# Patient Record
Sex: FEMALE | Race: WHITE | Employment: OTHER | URBAN - METROPOLITAN AREA
[De-identification: names, ages, dates, MRNs, and addresses within clinical notes are randomized per-mention and may not be internally consistent; named-entity substitution may affect disease eponyms.]

---

## 2018-10-29 ENCOUNTER — RECORDS - HEALTHEAST (OUTPATIENT)
Dept: LAB | Facility: CLINIC | Age: 83
End: 2018-10-29

## 2018-10-30 LAB
ANION GAP SERPL CALCULATED.3IONS-SCNC: 9 MMOL/L (ref 5–18)
BUN SERPL-MCNC: 28 MG/DL (ref 8–28)
CALCIUM SERPL-MCNC: 9.8 MG/DL (ref 8.5–10.5)
CHLORIDE BLD-SCNC: 102 MMOL/L (ref 98–107)
CO2 SERPL-SCNC: 25 MMOL/L (ref 22–31)
CREAT SERPL-MCNC: 1.18 MG/DL (ref 0.6–1.1)
ERYTHROCYTE [DISTWIDTH] IN BLOOD BY AUTOMATED COUNT: 13.9 % (ref 11–14.5)
GFR SERPL CREATININE-BSD FRML MDRD: 43 ML/MIN/1.73M2
GLUCOSE BLD-MCNC: 93 MG/DL (ref 70–125)
HCT VFR BLD AUTO: 39 % (ref 35–47)
HGB BLD-MCNC: 12.1 G/DL (ref 12–16)
MCH RBC QN AUTO: 32.4 PG (ref 27–34)
MCHC RBC AUTO-ENTMCNC: 31 G/DL (ref 32–36)
MCV RBC AUTO: 104 FL (ref 80–100)
PLATELET # BLD AUTO: 369 THOU/UL (ref 140–440)
PMV BLD AUTO: 9.8 FL (ref 8.5–12.5)
POTASSIUM BLD-SCNC: 5.1 MMOL/L (ref 3.5–5)
RBC # BLD AUTO: 3.74 MILL/UL (ref 3.8–5.4)
SODIUM SERPL-SCNC: 136 MMOL/L (ref 136–145)
WBC: 6.3 THOU/UL (ref 4–11)

## 2020-10-06 ENCOUNTER — TRANSFERRED RECORDS (OUTPATIENT)
Dept: MULTI SPECIALTY CLINIC | Facility: CLINIC | Age: 85
End: 2020-10-06

## 2020-10-06 DIAGNOSIS — Z53.9 ERRONEOUS ENCOUNTER--DISREGARD: Primary | ICD-10-CM

## 2020-10-08 ENCOUNTER — TRANSFERRED RECORDS (OUTPATIENT)
Dept: HEALTH INFORMATION MANAGEMENT | Facility: CLINIC | Age: 85
End: 2020-10-08

## 2020-10-09 ENCOUNTER — HOSPITAL ENCOUNTER (INPATIENT)
Facility: CLINIC | Age: 85
LOS: 3 days | Discharge: NURSING, REHAB OR LONG-TERM CARE | DRG: 177 | End: 2020-10-12
Attending: HOSPITALIST | Admitting: HOSPITALIST
Payer: MEDICARE

## 2020-10-09 ENCOUNTER — TELEPHONE (OUTPATIENT)
Facility: CLINIC | Age: 85
End: 2020-10-09

## 2020-10-09 ENCOUNTER — TRANSFERRED RECORDS (OUTPATIENT)
Dept: HEALTH INFORMATION MANAGEMENT | Facility: CLINIC | Age: 85
End: 2020-10-09

## 2020-10-09 ENCOUNTER — APPOINTMENT (OUTPATIENT)
Dept: GENERAL RADIOLOGY | Facility: CLINIC | Age: 85
DRG: 177 | End: 2020-10-09
Attending: HOSPITALIST
Payer: MEDICARE

## 2020-10-09 DIAGNOSIS — U07.1 2019 NOVEL CORONAVIRUS DISEASE (COVID-19): ICD-10-CM

## 2020-10-09 DIAGNOSIS — R05.9 COUGH: Primary | ICD-10-CM

## 2020-10-09 LAB
ANION GAP SERPL CALCULATED.3IONS-SCNC: 8 MMOL/L (ref 3–14)
BUN SERPL-MCNC: 22 MG/DL (ref 7–30)
C COLI+JEJUNI+LARI FUSA STL QL NAA+PROBE: NOT DETECTED
C DIFF TOX B STL QL: NEGATIVE
CALCIUM SERPL-MCNC: 8.3 MG/DL (ref 8.5–10.1)
CHLORIDE SERPL-SCNC: 104 MMOL/L (ref 94–109)
CO2 SERPL-SCNC: 25 MMOL/L (ref 20–32)
CREAT SERPL-MCNC: 1.23 MG/DL (ref 0.52–1.04)
CRP SERPL-MCNC: 5.7 MG/L (ref 0–8)
D DIMER PPP FEU-MCNC: 0.8 UG/ML FEU (ref 0–0.5)
EC STX1 GENE STL QL NAA+PROBE: NOT DETECTED
EC STX2 GENE STL QL NAA+PROBE: NOT DETECTED
ENTERIC PATHOGEN COMMENT: NORMAL
ERYTHROCYTE [DISTWIDTH] IN BLOOD BY AUTOMATED COUNT: 14.4 % (ref 10–15)
GFR SERPL CREATININE-BSD FRML MDRD: 39 ML/MIN/{1.73_M2}
GLUCOSE SERPL-MCNC: 84 MG/DL (ref 70–99)
HCT VFR BLD AUTO: 36.7 % (ref 35–47)
HGB BLD-MCNC: 11.4 G/DL (ref 11.7–15.7)
LABORATORY COMMENT REPORT: ABNORMAL
MCH RBC QN AUTO: 32.4 PG (ref 26.5–33)
MCHC RBC AUTO-ENTMCNC: 31.1 G/DL (ref 31.5–36.5)
MCV RBC AUTO: 104 FL (ref 78–100)
NOROV GI+II ORF1-ORF2 JNC STL QL NAA+PR: NOT DETECTED
PLATELET # BLD AUTO: 115 10E9/L (ref 150–450)
POTASSIUM SERPL-SCNC: 4.5 MMOL/L (ref 3.4–5.3)
PROCALCITONIN SERPL-MCNC: <0.05 NG/ML
RBC # BLD AUTO: 3.52 10E12/L (ref 3.8–5.2)
RVA NSP5 STL QL NAA+PROBE: NOT DETECTED
SALMONELLA SP RPOD STL QL NAA+PROBE: NOT DETECTED
SARS-COV-2 RNA SPEC QL NAA+PROBE: NORMAL
SARS-COV-2 RNA SPEC QL NAA+PROBE: POSITIVE
SHIGELLA SP+EIEC IPAH STL QL NAA+PROBE: NOT DETECTED
SODIUM SERPL-SCNC: 137 MMOL/L (ref 133–144)
SPECIMEN SOURCE: ABNORMAL
SPECIMEN SOURCE: NORMAL
SPECIMEN SOURCE: NORMAL
V CHOL+PARA RFBL+TRKH+TNAA STL QL NAA+PR: NOT DETECTED
WBC # BLD AUTO: 3.6 10E9/L (ref 4–11)
Y ENTERO RECN STL QL NAA+PROBE: NOT DETECTED

## 2020-10-09 PROCEDURE — 85379 FIBRIN DEGRADATION QUANT: CPT | Performed by: HOSPITALIST

## 2020-10-09 PROCEDURE — 71045 X-RAY EXAM CHEST 1 VIEW: CPT

## 2020-10-09 PROCEDURE — 86140 C-REACTIVE PROTEIN: CPT | Performed by: HOSPITALIST

## 2020-10-09 PROCEDURE — 120N000004 HC R&B MS OVERFLOW

## 2020-10-09 PROCEDURE — U0003 INFECTIOUS AGENT DETECTION BY NUCLEIC ACID (DNA OR RNA); SEVERE ACUTE RESPIRATORY SYNDROME CORONAVIRUS 2 (SARS-COV-2) (CORONAVIRUS DISEASE [COVID-19]), AMPLIFIED PROBE TECHNIQUE, MAKING USE OF HIGH THROUGHPUT TECHNOLOGIES AS DESCRIBED BY CMS-2020-01-R: HCPCS | Performed by: HOSPITALIST

## 2020-10-09 PROCEDURE — 85027 COMPLETE CBC AUTOMATED: CPT | Performed by: HOSPITALIST

## 2020-10-09 PROCEDURE — 99223 1ST HOSP IP/OBS HIGH 75: CPT | Mod: AI | Performed by: HOSPITALIST

## 2020-10-09 PROCEDURE — 87506 IADNA-DNA/RNA PROBE TQ 6-11: CPT | Performed by: HOSPITALIST

## 2020-10-09 PROCEDURE — 250N000013 HC RX MED GY IP 250 OP 250 PS 637: Performed by: PHYSICIAN ASSISTANT

## 2020-10-09 PROCEDURE — 999N000156 HC STATISTIC RCP CONSULT EA 30 MIN

## 2020-10-09 PROCEDURE — 84145 PROCALCITONIN (PCT): CPT | Performed by: HOSPITALIST

## 2020-10-09 PROCEDURE — 99207 PR APP CREDIT; MD BILLING SHARED VISIT: CPT | Performed by: PHYSICIAN ASSISTANT

## 2020-10-09 PROCEDURE — 80048 BASIC METABOLIC PNL TOTAL CA: CPT | Performed by: HOSPITALIST

## 2020-10-09 PROCEDURE — 36415 COLL VENOUS BLD VENIPUNCTURE: CPT | Performed by: HOSPITALIST

## 2020-10-09 PROCEDURE — 87493 C DIFF AMPLIFIED PROBE: CPT | Performed by: HOSPITALIST

## 2020-10-09 RX ORDER — ACETAMINOPHEN 325 MG/1
650 TABLET ORAL EVERY 4 HOURS PRN
Status: DISCONTINUED | OUTPATIENT
Start: 2020-10-09 | End: 2020-10-12 | Stop reason: HOSPADM

## 2020-10-09 RX ORDER — NALOXONE HYDROCHLORIDE 0.4 MG/ML
.1-.4 INJECTION, SOLUTION INTRAMUSCULAR; INTRAVENOUS; SUBCUTANEOUS
Status: DISCONTINUED | OUTPATIENT
Start: 2020-10-09 | End: 2020-10-12 | Stop reason: HOSPADM

## 2020-10-09 RX ORDER — DIVALPROEX SODIUM 125 MG/1
125 CAPSULE, COATED PELLETS ORAL 3 TIMES DAILY
Status: DISCONTINUED | OUTPATIENT
Start: 2020-10-09 | End: 2020-10-12 | Stop reason: HOSPADM

## 2020-10-09 RX ORDER — AMLODIPINE BESYLATE 2.5 MG/1
2.5 TABLET ORAL DAILY
Status: DISCONTINUED | OUTPATIENT
Start: 2020-10-09 | End: 2020-10-12 | Stop reason: HOSPADM

## 2020-10-09 RX ORDER — ONDANSETRON 4 MG/1
4 TABLET, ORALLY DISINTEGRATING ORAL EVERY 6 HOURS PRN
Status: DISCONTINUED | OUTPATIENT
Start: 2020-10-09 | End: 2020-10-12 | Stop reason: HOSPADM

## 2020-10-09 RX ORDER — DIVALPROEX SODIUM 125 MG/1
125 CAPSULE, COATED PELLETS ORAL 3 TIMES DAILY
COMMUNITY

## 2020-10-09 RX ORDER — LIDOCAINE 40 MG/G
CREAM TOPICAL
Status: DISCONTINUED | OUTPATIENT
Start: 2020-10-09 | End: 2020-10-12 | Stop reason: HOSPADM

## 2020-10-09 RX ORDER — QUETIAPINE FUMARATE 50 MG/1
50 TABLET, FILM COATED ORAL DAILY
Status: DISCONTINUED | OUTPATIENT
Start: 2020-10-09 | End: 2020-10-12 | Stop reason: HOSPADM

## 2020-10-09 RX ORDER — GABAPENTIN 250 MG/5ML
50 SOLUTION ORAL
COMMUNITY

## 2020-10-09 RX ORDER — ATORVASTATIN CALCIUM 20 MG/1
20 TABLET, FILM COATED ORAL AT BEDTIME
Status: DISCONTINUED | OUTPATIENT
Start: 2020-10-09 | End: 2020-10-12 | Stop reason: HOSPADM

## 2020-10-09 RX ORDER — DULOXETIN HYDROCHLORIDE 60 MG/1
60 CAPSULE, DELAYED RELEASE ORAL DAILY
COMMUNITY

## 2020-10-09 RX ORDER — AMLODIPINE BESYLATE 2.5 MG/1
2.5 TABLET ORAL DAILY
COMMUNITY

## 2020-10-09 RX ORDER — QUETIAPINE FUMARATE 50 MG/1
50 TABLET, FILM COATED ORAL EVERY 4 HOURS PRN
COMMUNITY

## 2020-10-09 RX ORDER — ONDANSETRON 2 MG/ML
4 INJECTION INTRAMUSCULAR; INTRAVENOUS EVERY 6 HOURS PRN
Status: DISCONTINUED | OUTPATIENT
Start: 2020-10-09 | End: 2020-10-12 | Stop reason: HOSPADM

## 2020-10-09 RX ORDER — ACETAMINOPHEN 325 MG/1
650 TABLET ORAL EVERY 4 HOURS PRN
COMMUNITY

## 2020-10-09 RX ORDER — GUAIFENESIN/DEXTROMETHORPHAN 100-10MG/5
5 SYRUP ORAL EVERY 4 HOURS PRN
Status: DISCONTINUED | OUTPATIENT
Start: 2020-10-09 | End: 2020-10-12 | Stop reason: HOSPADM

## 2020-10-09 RX ORDER — GABAPENTIN 250 MG/5ML
50 SOLUTION ORAL
Status: DISCONTINUED | OUTPATIENT
Start: 2020-10-09 | End: 2020-10-12 | Stop reason: HOSPADM

## 2020-10-09 RX ORDER — TRAZODONE HYDROCHLORIDE 50 MG/1
50 TABLET, FILM COATED ORAL
COMMUNITY

## 2020-10-09 RX ORDER — QUETIAPINE FUMARATE 50 MG/1
50 TABLET, FILM COATED ORAL DAILY
COMMUNITY

## 2020-10-09 RX ORDER — MIRTAZAPINE 15 MG/1
15 TABLET, FILM COATED ORAL AT BEDTIME
COMMUNITY

## 2020-10-09 RX ORDER — CHOLECALCIFEROL (VITAMIN D3) 50 MCG
1 TABLET ORAL DAILY
COMMUNITY

## 2020-10-09 RX ORDER — DULOXETIN HYDROCHLORIDE 60 MG/1
60 CAPSULE, DELAYED RELEASE ORAL DAILY
Status: DISCONTINUED | OUTPATIENT
Start: 2020-10-09 | End: 2020-10-12 | Stop reason: HOSPADM

## 2020-10-09 RX ORDER — ALBUTEROL SULFATE 90 UG/1
2 AEROSOL, METERED RESPIRATORY (INHALATION) EVERY 6 HOURS PRN
Status: DISCONTINUED | OUTPATIENT
Start: 2020-10-09 | End: 2020-10-12 | Stop reason: HOSPADM

## 2020-10-09 RX ORDER — MIRTAZAPINE 15 MG/1
15 TABLET, FILM COATED ORAL AT BEDTIME
Status: DISCONTINUED | OUTPATIENT
Start: 2020-10-09 | End: 2020-10-12 | Stop reason: HOSPADM

## 2020-10-09 RX ORDER — DIVALPROEX SODIUM 125 MG/1
125 TABLET, DELAYED RELEASE ORAL 3 TIMES DAILY
Status: ON HOLD | COMMUNITY
End: 2020-10-09

## 2020-10-09 RX ORDER — GABAPENTIN 250 MG/5ML
50 SOLUTION ORAL 4 TIMES DAILY
COMMUNITY

## 2020-10-09 RX ORDER — QUETIAPINE FUMARATE 50 MG/1
50 TABLET, FILM COATED ORAL EVERY 4 HOURS PRN
Status: DISCONTINUED | OUTPATIENT
Start: 2020-10-09 | End: 2020-10-12 | Stop reason: HOSPADM

## 2020-10-09 RX ORDER — GABAPENTIN 250 MG/5ML
50 SOLUTION ORAL 4 TIMES DAILY
Status: DISCONTINUED | OUTPATIENT
Start: 2020-10-09 | End: 2020-10-12 | Stop reason: HOSPADM

## 2020-10-09 RX ORDER — ATORVASTATIN CALCIUM 20 MG/1
20 TABLET, FILM COATED ORAL AT BEDTIME
COMMUNITY

## 2020-10-09 RX ADMIN — DULOXETINE 60 MG: 60 CAPSULE, DELAYED RELEASE ORAL at 17:06

## 2020-10-09 RX ADMIN — ATORVASTATIN CALCIUM 20 MG: 20 TABLET, FILM COATED ORAL at 20:54

## 2020-10-09 RX ADMIN — GUAIFENESIN AND DEXTROMETHORPHAN 5 ML: 100; 10 SYRUP ORAL at 20:49

## 2020-10-09 RX ADMIN — GABAPENTIN 50 MG: 250 SUSPENSION ORAL at 20:51

## 2020-10-09 RX ADMIN — GUAIFENESIN AND DEXTROMETHORPHAN 5 ML: 100; 10 SYRUP ORAL at 14:51

## 2020-10-09 RX ADMIN — DIVALPROEX SODIUM 125 MG: 125 CAPSULE, COATED PELLETS ORAL at 17:06

## 2020-10-09 RX ADMIN — GABAPENTIN 50 MG: 250 SUSPENSION ORAL at 17:07

## 2020-10-09 RX ADMIN — DIVALPROEX SODIUM 125 MG: 125 CAPSULE, COATED PELLETS ORAL at 20:51

## 2020-10-09 RX ADMIN — MIRTAZAPINE 15 MG: 15 TABLET, FILM COATED ORAL at 20:50

## 2020-10-09 RX ADMIN — QUETIAPINE 50 MG: 50 TABLET ORAL at 17:06

## 2020-10-09 ASSESSMENT — ACTIVITIES OF DAILY LIVING (ADL): EQUIPMENT_CURRENTLY_USED_AT_HOME: WALKER, STANDARD

## 2020-10-09 NOTE — PLAN OF CARE
ROOM #208    Living Situation (if not independent, order SW consult):  Facility name:  :    Activity level at baseline: independent  Activity level on admit: SBA      Patient registered to observation; given Patient Bill of Rights; given the opportunity to ask questions about observation status and their plan of care.  Patient has been oriented to the observation room, bathroom and call light is in place.    Discussed discharge goals and expectations with patient/family.

## 2020-10-09 NOTE — PROGRESS NOTES
[facility]    Hospitalist Progress Note      Assessment & Plan   Mercedes Fraire is a 88 year old female with PMhx of aortic stenosis (s/p AVR), CAD (s/p CABG), OA, anxiety, diverticulitis, colon cancer, renal cell ca, HTN, osteoporosis, dyslipidemia, IBS, osteoporosis, who was admitted to observation from outside ED for cough, diarrhea and probable COVID-19 exposure.    Active Hospital Problems    #COVID 19 Virus Infection  Presented with mild hypoxemic respiratory failure, generalized weakness, cough, non-bloody loose stools. Symptom onset ~Wednesday 10/7.  Maintaining oxygen saturations on room air. No leukocytosis, systemically non toxic appearing. Inflammatory markers not significantly elevated including procal. No pneumonia or opacities on repeated CXR.    Patient lives in a facility with multiple recent COVID-19 cases.    -Continue hold of abx as no clear sign of superimposed bacterial infection at this point and clinically stable  -if hypoxemic, clinically worsening would start decadron   -Supportive cares with encourage p.o. fluids, acetaminophen, Robitussin for cough, prn albuterol   -At this point medically stable however unable to return to assisted living, social work consulted to assist in finding short-term TCU to meet quarantine requirements   -continue contact precautions   -d dimer low, PCD's for DVT ppx  -discontinue enteric precautions, c.diff and enteric panel neg.    #Renal Insufficiency   Creatinine 1.23 on admission.  No recent data points to review but creatinine appears to fall around 1.2. Volume status appropriate. Does not appear to be taking NSAIDs.  Appears may have a remote history of renal cell carcinoma however unable to review these records of this.  -Repeat BMP with am labs.    -Avoid nephrotoxic agents as able.      Chronic Medical Conditions    #CAD s/p CABG, AORTIC STENOSIS s/p AVR: Appears stable. No cardiac complaints. Volume status appears  "appropriate. Unable to review any recent medical records or documentation from Cardiologist at this time.  #Dementia: Appears to be at baseline per discussion over the phone with daughter.  #Hypertension: Normotensive. Resume PTA amlodipine.  #Hyperlipidemia: Resume statin.  #Anxiety: Resume prior to admission Depakote, Cymbalta, gabapentin, seroquel.        DVT Prophylaxis: Pneumatic Compression Devices  Code Status: No CPR- Do NOT Intubate  Expected discharge: Tomorrow, recommended to transitional care unit once safe disposition plan/ TCU bed available. SW consulted.    Family updated with plan of care: Camille, via phone x 2. Discussed discharge planning      Sariah Moss PA-C  Text Page (7am - 5pm, M-F)    Interval History   No new complaints. Patient states she feels \"normal\". Afebrile. No dyspnea. Intermittent nonproductive cough. No abdominal pain, nausea.  Well tolerating normal diet. Small non formed stool. No nausea, dizziness. No CP, orthopnea. No ambulation concerns.       -Data reviewed today: I reviewed all new labs and imaging results over the last 24 hours. I personally reviewed CXR and labs as noted above.  Physical Exam   Vital Signs with Ranges  Temp:  [95.5  F (35.3  C)-98.3  F (36.8  C)] 98.3  F (36.8  C)  Pulse:  [55-70] 70  Resp:  [16-20] 17  BP: (116-127)/(63-75) 127/75  SpO2:  [93 %-99 %] 94 %  No intake/output data recorded.      Constitutional:Awake, alert, no apparent distress  Respiratory:  Normal work of breathing. Lungs clear to auscultation bilaterally, no crackles or wheezing.  Cardiovascular: Regular rate and rhythm, normal S1 and S2, and no murmur appreciated.   GI: Normal bowel sounds, soft, non-distended, non-tender.   Skin/Integument: Warm dry. no edema.  Neuro: Oriented to self. Can not recall hospital or location, date. Moving all extremities. Coordination and sensation grossly intact. Speech clear. No focal deficits.   Psych: Appropriate affect.             Medications       " amLODIPine  2.5 mg Oral Daily     atorvastatin  20 mg Oral At Bedtime     divalproex sodium delayed-release  125 mg Oral TID     DULoxetine  60 mg Oral Daily     gabapentin  50 mg Oral 4x Daily     mirtazapine  15 mg Oral At Bedtime     QUEtiapine  50 mg Oral Daily     sodium chloride (PF)  3 mL Intracatheter Q8H       Data   Recent Labs   Lab 10/09/20  0644   WBC 3.6*   HGB 11.4*   *   *      POTASSIUM 4.5   CHLORIDE 104   CO2 25   BUN 22   CR 1.23*   ANIONGAP 8   SANDRITA 8.3*   GLC 84       Recent Results (from the past 24 hour(s))   XR Chest Port 1 View    Narrative    EXAM: XR CHEST PORT 1 VW  LOCATION: [facility]  DATE/TIME: 10/9/2020 4:56 AM    INDICATION: Shortness of breath, cough  COMPARISON: None.      Impression    IMPRESSION: Multiple median sternotomy wires. Prosthetic aortic valve. Cardiac silhouette within normal limits. Tortuous atherosclerotic aorta. No pneumothorax or pleural effusion. No acute osseous abnormality. Diffuse osteopenia.

## 2020-10-09 NOTE — H&P
[facility]    History and Physical  Hospitalist       Date of Admission:  (Not on file)    Assessment & Plan   Mercedes Fraire is a 88 year old female with a past medical history of dementia, hypertension, hyperlipidemia, anxiety and osteoporosis who presents with generalized weakness, cough hypoxemic respiratory failure and watery diarrhea x 1-2 days.     #Mild hypoxemic respiratory failure, Generalized weakness, cough: Symptoms seem to start Wednesday 10/7.  Started with generalized weakness and watery diarrhea.  Also had a cough at this time.  The following day patient's oxygen levels were checked and she was found to have an oxygen level of 86% at her facility.  Concern for COVID-19 given the patient lives in a facility with multiple recent COVID-19 cases.  Chest x-ray at outside facility did not show evidence of an acute CP process.  No leukocytosis noted.  She was afebrile, non-tachycardic and normotensive.  She was requiring only 1 to 2 L of oxygen but saturating in mid-90's on this supplemental oxygen.   -COVID-19 ordered.  We will also check CRP and d-dimer.  Also checking procal.  Holding antibiotics as no clear sign of bacterial infection at this point  -We will not give further IV fluids as got 1 L in the outside ED and would prefer to keep lungs on the drier side until COVID-19 is back.  -Will repeat CXR at our facility.   -Would discontinue precautions only if COVID-19 negative both here and from outside facility.    #Diarrhea: Started on Wednesday 10/7.  Watery in nature.  No blood noted.   -Enteric panel and C. difficile to be tested on admission.  -Monitor output, bolus as necessary if significant output and not able to keep up PO.    Chronic Medical Conditions  #Dementia: Patient answers questions appropriately but does not believe she is taking any daily medications.  Await pharmacy med rec, resume meds once verified  #Hypertension: Await med rec.  Resume if blood  pressures allow  #Hyperlipidemia: Resume statin once reconciled  #Anxiety: Await med rec and resume once verified.    DVT Prophylaxis: Pneumatic Compression Devices  Code Status: DNR / DNI confirmed on paperwork and patient tells me she would not want these interventions on admission.  Dispo: Expect greater than 2 midnights for management of above.    Bryan Wood MD    Primary Care Physician   No primary care provider on file.    Chief Complaint   Cough, generalized weakness    History is obtained from the patient, discussed with ER provider    History of Present Illness   Mercedes Fraire is a 88 year old female with a past medical history of dementia, hypertension, hyperlipidemia, anxiety and osteoporosis who presents with generalized weakness, cough and watery diarrhea.    Patient resides at assisted living facility in [place] which is had multiple recent cases of COVID-19.  Patient reportedly started having watery diarrhea on Wednesday 10/7.  Also feeling generally weak.  The following day patient was reportedly still feeling weak and was noted to be hypoxemic saturating 86% on room air.  At this point patient was brought to the local ED for evaluation.  Patient notes that she has had a mild cough over the last couple of days.  She does not have any shortness of breath currently.  No chest pain.  No subjective fevers currently.  She denies any nausea or vomiting.  She did state that she did have some watery diarrhea but does not remember how much.    At the outside ED, patient was afebrile and saturating 90% on room air and had to be placed on 2 L of oxygen via nasal cannula.  Chest x-ray done without evidence of acute CP process.  EKG showed sinus rhythm with a first-degree AV block and a right bundle branch block without acute ST segment changes.  CBC without leukocytosis.  BMP showed creatinine of 1.3 (unknown baseline).  Patient was given 1 L IV fluid bolus and transferred due to unavailability of beds  elsewhere.    Past Medical History    Dementia  Osteoporosis  Hypertension  Anxiety  history of compression fracture    Past Surgical History   None noted    Prior to Admission Medications   None   Acetaminophen 650 mg every 4 hours as needed  Amlodipine 2-1/2 mg daily  Atorvastatin 20 mg daily  Vitamin D3  Depakote 125 mg 3 times daily  Duloxetine 30 mg daily  Mirtazapine 15 mg at bedtime  Seroquel 50 mg at bedtime  Trazodone 50 mg at bedtime as needed  Losartan 25 mg daily    Allergies   BuSpar, hydralazine, Zocor, lisinopril, clindamycin, neomycin, losartan    Social History   Lives at [facility] in [facility]  Non-smoker, nondrinker    Family History   Noncontributory    Review of Systems   The 10 point Review of Systems is negative other than noted in the HPI or here.     Physical Exam   Temp: 97.4  F (36.3  C) Temp src: Oral BP: 116/70 Pulse: 55   Resp: 16 SpO2: 99 % O2 Device: None (Room air)    Vital Signs with Ranges  Temp:  [97.4  F (36.3  C)] 97.4  F (36.3  C)  Pulse:  [55] 55  Resp:  [16] 16  BP: (116)/(70) 116/70  SpO2:  [99 %] 99 %  169 lbs 14.4 oz    Constitutional: Elderly female, no acute distress.  Nontoxic.  HEENT: Normocephalic, MMM, no elevation of JVD noted  Respiratory: Normal work of breathing.  No wheezes.  Mild inspiratory crackles noted at the base bilaterally.  Cardiovascular: Regular, no murmur  GI: BS+, NT, ND, no rebound or guarding  Lymph/Hematologic: No bruising. No cervical LAD  Skin: No rash  Musculoskeletal: Nl Tone, No edema noted  Neurologic: Hard of hearing.  She is ANO x2-3.  Answers appropriately.  Moves all extremities.  No tremor  Psychiatric: Calm    Data   Data reviewed today:  I personally reviewed   UA negative for nitrite, leukocyte Estrace. +occasional WBC.    CBC with WBC 3.8, hemoglobin 11.1, platelet 121  BMP with sodium 134, K+ 4.3, BUN/creatinine 26/1.33.    Lactic acid 1.8    Chest x-ray reportedly showing no acute pulmonary  process.    EKG showed sinus rhythm with first-degree AV block and right bundle branch block.  No ST segment elevations or depressions.

## 2020-10-09 NOTE — PHARMACY-ADMISSION MEDICATION HISTORY
Admission medication history interview status for this patient is complete. See [facility] admission navigator for allergy information, prior to admission medications and immunization status.     Medication history interview done via telephone during Covid-19 pandemic, indicate source(s): [facility] in [place], [place].  Medication history resources (including written lists, pill bottles, clinic record): yes, faxed med list from [facility].  Pharmacy: [facility], phone # [phone]    Changes made to PTA medication list:  Added: all  Deleted: none  Changed: none    Actions taken by pharmacist (provider contacted, etc):  Called [facility] 3x, left rui @[phone], asked for med list. Called [facility] to clarify Depakote formulation dispensed (DEPAKOTE SPRINKLE).     Additional medication history information: primary physician - Dr. Que Foote.    Medication reconciliation/reorder completed by provider prior to medication history?  no   (Y/N)     For patients on insulin therapy: no  Prior to Admission medications    Medication Sig Taking? Auth Provider   acetaminophen (TYLENOL) 325 MG tablet Take 650 mg by mouth every 4 hours as needed for mild pain Yes Unknown, Entered By History   amLODIPine (NORVASC) 2.5 MG tablet Take 2.5 mg by mouth daily Yes Unknown, Entered By History   atorvastatin (LIPITOR) 20 MG tablet Take 20 mg by mouth At Bedtime Yes Unknown, Entered By History   DIVALPROEX SODIUM PO (DEPAKOTE SPRINKLE) Take 125 mg by mouth 3 times daily Yes Unknown, Entered By History   DULoxetine (CYMBALTA) 60 MG capsule Take 60 mg by mouth daily  Yes Unknown, Entered By History   gabapentin (NEURONTIN) 250 MG/5ML solution Take 50 mg by mouth 4 times daily Yes Unknown, Entered By History   gabapentin (NEURONTIN) 250 MG/5ML solution Take 50 mg by mouth every 2 hours as needed for other (anxiety, pain, restlessness) Max 6ml/day* Yes Unknown, Entered By History   mirtazapine (REMERON) 15  MG tablet Take 15 mg by mouth At Bedtime Yes Unknown, Entered By History   QUEtiapine (SEROQUEL) 50 MG tablet Take 50 mg by mouth daily at bedtime for paranoia/agitation Yes Unknown, Entered By History   QUEtiapine (SEROQUEL) 50 MG tablet Take 50 mg by mouth every 4 hours as needed (paranoia, agitation) Max 3 prn doses/24hrs. Yes Unknown, Entered By History   traZODone (DESYREL) 50 MG tablet Take 50 mg by mouth nightly as needed for sleep MR x1 after 30min before 3am for sleeplessness Yes Unknown, Entered By History   vitamin D3 (CHOLECALCIFEROL) 50 mcg (2000 units) tablet Take 1 tablet by mouth daily Yes Unknown, Entered By History

## 2020-10-09 NOTE — PROGRESS NOTES
"[facility] RCAT  Date: October 9, 2020  Admission Dx: Cough  Pulmonary History: None  Home Nebulizer/MDI Use: None  Home Oxygen: None  Acuity Level (RCAT flow sheet): Level 4    Aerosol Therapy initiated: Albuterol MDI prn    Pulmonary Hygiene initiated: Coughing and deep breathing techniques    Volume Expansion initiated: Incentive spirometer    Current Oxygen Requirements: Room Air  Current SpO2: 93%    Re-evaluation date: 10/12/2020    Patient Education: Education was provided on RCAT process. Will continue to do education with patient.    See \"RT Assessments\" flow sheet for patient assessment scoring and Acuity Level Details.     Curtis Moctezuma, RT on 10/9/2020 at 10:32 AM      "

## 2020-10-09 NOTE — TELEPHONE ENCOUNTER
Patient Transfer:    88 yof with PMH of generalized weakness, fatigue, cough who lives in memory care facility with multiple positive COVID-19 cases.    Fever at her facility, 4 episodes of watery diarrhea and mild cough.  Found to be hypoxemic at 86% on RA at facility so sent to ED.      Afebrile, normotensive, 91% on RA placed on 2L and saturating 97%.      No elevation of WBC, mild anemia.    Cr 1.36, no known baseline    UA negative for nitrite, LE.      ECG shows RBBB, no prior available.  CXR shows no acute CP process.      Received 1L of IVF.  No abx given.    She is DNR/DNI.      Accepted to general medical bed. COVID-19 precautions.    Bryan Wood MD

## 2020-10-09 NOTE — CONSULTS
Care Management Initial Consult    General Information  Assessment completed with:: Care Team Member, ROSANNA Dominguez at [facility]  Type of CM/SW Visit: Initial Assessment  Primary Care Provider verified and updated as needed?: No  Readmission Within the Last 30 Days: no previous admission in last 30 days     Reason for Consult: discharge planning.     Communication Assessment  Patient's communication style: spoken language (English or Bilingual)  Hearing Difficulty or Deaf: no Wear Glasses or Blind: yes    Cognitive  Cognitive/Neuro/Behavioral: .WDL except, orientation  Level of Consciousness: confused  Arousal Level: opens eyes spontaneously  Orientation: disoriented to, place, situation  Mood/Behavior: calm, cooperative  Best Language: 0 - No aphasia  Speech: clear, spontaneous    Living Environment:   People in home: facility resident     Current living Arrangements: assisted living  Name of Facility: [facility], [place]. SW spoke with RN Angelica at [facility]. Per RN, pt resides in  facility and ambulates independently with a walker at baseline. Pt receives assistance with med administration and SBA for bathing, otherwise she is independent with all ADL's. Symptomatic COVID test results are still pending. If pt's test is negative, she is able to return over the weekend. If pt is COVID+, she will have to discharge to a COVID SNF for 10 days before returning to , per their policy.  facility reports that they prefer pt's discharge to [facility] if they have COVID, but facility is pt/family choice.        Family/Social Support:  Care provided by: self  Provides care for: no one  Marital Status:   Who is your support system?: Facility resident(s)/Staff     Description of Support System: Supportive, Involved  Description of Support System: Supportive, Involved           Description of Support System: Supportive, Involved             Additional Information: Plan TBD, awaiting COVID results. If pt's COVID  test is negative, pt is able to return to  over the weekend. RN # at : [phone]. RN Angelica is aware of pt's potential return to  this weekend. Discharge orders to be faxed to [phone]) [phone].     If pt's COVID test is positive, SNF referrals to be sent. SW will continue to follow.     LSIA Serrano, [facility]   Inpatient Care Coordination  [facility]   [phone]

## 2020-10-09 NOTE — PLAN OF CARE
Alert, oriented to self and place. Up SBA w/gaitbelt and walker-per pt. Baseline is ind w/walker. Pt. Resides at [facility] and daughter Camille helps with decision making. Symptomatic COVID pending. C-diff-neg, enteric-neg. Pt. Has a frequent, productive cough. O2 sats stable at 93-97% on RA. SOB noted with exertion-relieved with rest. Unable to auscultate LS d/t wearing a PAPR for iso. Had one small formed stool and 2 loose stools today. Tolerated regular diet. Afebrile. WBC-WNL. PIV x2- SL. Orthos-neg. Denies pain. Plan for continued supportive cares and pend labs. /75 (BP Location: Left arm)   Pulse 70   Temp 98.3  F (36.8  C) (Oral)   Resp 17   Wt 77.1 kg (169 lb 14.4 oz)   SpO2 94%

## 2020-10-10 LAB
ANION GAP SERPL CALCULATED.3IONS-SCNC: 5 MMOL/L (ref 3–14)
BASOPHILS # BLD AUTO: 0 10E9/L (ref 0–0.2)
BASOPHILS NFR BLD AUTO: 0.5 %
BUN SERPL-MCNC: 20 MG/DL (ref 7–30)
CALCIUM SERPL-MCNC: 8.3 MG/DL (ref 8.5–10.1)
CHLORIDE SERPL-SCNC: 106 MMOL/L (ref 94–109)
CO2 SERPL-SCNC: 27 MMOL/L (ref 20–32)
CREAT SERPL-MCNC: 1.02 MG/DL (ref 0.52–1.04)
DIFFERENTIAL METHOD BLD: ABNORMAL
EOSINOPHIL # BLD AUTO: 0 10E9/L (ref 0–0.7)
EOSINOPHIL NFR BLD AUTO: 1 %
ERYTHROCYTE [DISTWIDTH] IN BLOOD BY AUTOMATED COUNT: 14.4 % (ref 10–15)
GFR SERPL CREATININE-BSD FRML MDRD: 49 ML/MIN/{1.73_M2}
GLUCOSE SERPL-MCNC: 84 MG/DL (ref 70–99)
HCT VFR BLD AUTO: 36.4 % (ref 35–47)
HGB BLD-MCNC: 11.2 G/DL (ref 11.7–15.7)
IMM GRANULOCYTES # BLD: 0 10E9/L (ref 0–0.4)
IMM GRANULOCYTES NFR BLD: 0.8 %
LYMPHOCYTES # BLD AUTO: 1.3 10E9/L (ref 0.8–5.3)
LYMPHOCYTES NFR BLD AUTO: 32 %
MCH RBC QN AUTO: 32.4 PG (ref 26.5–33)
MCHC RBC AUTO-ENTMCNC: 30.8 G/DL (ref 31.5–36.5)
MCV RBC AUTO: 105 FL (ref 78–100)
MONOCYTES # BLD AUTO: 0.8 10E9/L (ref 0–1.3)
MONOCYTES NFR BLD AUTO: 19 %
NEUTROPHILS # BLD AUTO: 1.8 10E9/L (ref 1.6–8.3)
NEUTROPHILS NFR BLD AUTO: 46.7 %
NRBC # BLD AUTO: 0 10*3/UL
NRBC BLD AUTO-RTO: 0 /100
PLATELET # BLD AUTO: 120 10E9/L (ref 150–450)
POTASSIUM SERPL-SCNC: 4.4 MMOL/L (ref 3.4–5.3)
RBC # BLD AUTO: 3.46 10E12/L (ref 3.8–5.2)
SODIUM SERPL-SCNC: 138 MMOL/L (ref 133–144)
WBC # BLD AUTO: 3.9 10E9/L (ref 4–11)

## 2020-10-10 PROCEDURE — 99233 SBSQ HOSP IP/OBS HIGH 50: CPT | Performed by: PHYSICIAN ASSISTANT

## 2020-10-10 PROCEDURE — 85025 COMPLETE CBC W/AUTO DIFF WBC: CPT | Performed by: PHYSICIAN ASSISTANT

## 2020-10-10 PROCEDURE — 36415 COLL VENOUS BLD VENIPUNCTURE: CPT | Performed by: PHYSICIAN ASSISTANT

## 2020-10-10 PROCEDURE — 250N000013 HC RX MED GY IP 250 OP 250 PS 637: Performed by: PHYSICIAN ASSISTANT

## 2020-10-10 PROCEDURE — 120N000004 HC R&B MS OVERFLOW

## 2020-10-10 PROCEDURE — 80048 BASIC METABOLIC PNL TOTAL CA: CPT | Performed by: PHYSICIAN ASSISTANT

## 2020-10-10 RX ORDER — GUAIFENESIN/DEXTROMETHORPHAN 100-10MG/5
5 SYRUP ORAL EVERY 4 HOURS PRN
DISCHARGE
Start: 2020-10-10

## 2020-10-10 RX ORDER — ALBUTEROL SULFATE 90 UG/1
2 AEROSOL, METERED RESPIRATORY (INHALATION) EVERY 6 HOURS PRN
DISCHARGE
Start: 2020-10-10

## 2020-10-10 RX ADMIN — DIVALPROEX SODIUM 125 MG: 125 CAPSULE, COATED PELLETS ORAL at 11:13

## 2020-10-10 RX ADMIN — DIVALPROEX SODIUM 125 MG: 125 CAPSULE, COATED PELLETS ORAL at 20:08

## 2020-10-10 RX ADMIN — AMLODIPINE BESYLATE 2.5 MG: 2.5 TABLET ORAL at 15:23

## 2020-10-10 RX ADMIN — DULOXETINE 60 MG: 60 CAPSULE, DELAYED RELEASE ORAL at 11:13

## 2020-10-10 RX ADMIN — GUAIFENESIN AND DEXTROMETHORPHAN 5 ML: 100; 10 SYRUP ORAL at 03:41

## 2020-10-10 RX ADMIN — DIVALPROEX SODIUM 125 MG: 125 CAPSULE, COATED PELLETS ORAL at 15:23

## 2020-10-10 RX ADMIN — GABAPENTIN 50 MG: 250 SUSPENSION ORAL at 15:24

## 2020-10-10 RX ADMIN — ATORVASTATIN CALCIUM 20 MG: 20 TABLET, FILM COATED ORAL at 20:09

## 2020-10-10 RX ADMIN — GABAPENTIN 50 MG: 250 SUSPENSION ORAL at 20:10

## 2020-10-10 RX ADMIN — GABAPENTIN 50 MG: 250 SUSPENSION ORAL at 11:18

## 2020-10-10 RX ADMIN — MIRTAZAPINE 15 MG: 15 TABLET, FILM COATED ORAL at 20:10

## 2020-10-10 RX ADMIN — QUETIAPINE 50 MG: 50 TABLET ORAL at 11:13

## 2020-10-10 NOTE — PROGRESS NOTES
[facility]    Hospitalist Progress Note      Assessment & Plan   Mercedes Fraire is a 88 year old female with PMhx of aortic stenosis (s/p AVR), CAD (s/p CABG), OA, anxiety, diverticulitis, colon cancer, renal cell ca, HTN, osteoporosis, dyslipidemia, IBS, osteoporosis, who was admitted to observation from outside ED for cough, diarrhea and probable COVID-19 exposure.      Active Hospital Problems     #COVID 19 Virus Infection  Presented with mild hypoxemic respiratory failure, maintaining oxygen saturations on room air. Additional symptoms to include generalized weakness, cough, non-bloody loose stools. Symptom onset ~Wednesday 10/7.  No leukocytosis, systemically non toxic appearing. Inflammatory markers not significantly elevated on admission including procal. No pneumonia or opacities on repeated CXR. At this point appears uncomplicated case without significant pulmonary involvement.   Patient resides in a facility with multiple recent COVID-19 cases.    -Continue hold of abx as no clear sign of superimposed bacterial infection at this point and clinically stable  -if hypoxemic, clinically worsening would start decadron consider transfer to [facility]  -spot check, amb O2  -Supportive cares with encourage p.o. fluids, acetaminophen, Robitussin for cough, prn albuterol   -At this point medically stable however unable to return to assisted living. Social work consulted to assist in finding short-term TCU to meet quarantine requirements. Bed unavailable at this time.   -continue contact precautions   -d dimer low, PCD's for DVT ppx  -discontinue enteric precautions, c.diff and enteric panel neg.     #Renal Insufficiency   Creatinine 1.02, 1.23 on admission.  No recent data points to review but creatinine appears to fall around 1.2. Volume status appropriate. Does not appear to be taking NSAIDs.  Appears may have a remote history of renal cell carcinoma however unable to review these records  of this.  -Repeat BMP with am labs.    -Avoid nephrotoxic agents as able.      Chronic Medical Conditions     #CAD s/p CABG, AORTIC STENOSIS s/p AVR: Appears stable. No cardiac complaints. Volume status appears appropriate. Unable to review any recent medical records or documentation from Cardiologist at this time.  #Dementia: Appears to be at baseline per discussion over the phone with daughter.  #Hypertension: Normotensive. Resume PTA amlodipine.  #Hyperlipidemia: Resume statin.  #Anxiety: Resume prior to admission Depakote, Cymbalta, gabapentin, seroquel.        DVT Prophylaxis: Pneumatic Compression Devices  Code Status: No CPR- Do NOT Intubate  Expected discharge: Monday when TCU bed available. SW consulted and following.     Family updated with plan of care: Camille, via phone.    Sariah Moss PA-C  Text Page (7am - 5pm, M-F)    Interval History   Afebrile. No new complaints.   No dyspnea or hypoxemia. Intermittent nonproductive cough. No abdominal pain, nausea.  Well tolerating normal diet. No stooling overnight. No nausea, dizziness. No CP, orthopnea. No ambulation concerns.     -Data reviewed today: I reviewed all new labs and imaging results over the last 24 hours.  I personally reviewed CXR and labs as noted above.     Physical Exam   Temp: 97.5  F (36.4  C) Temp src: Oral BP: 129/68 Pulse: 70   Resp: 14 SpO2: 93 % O2 Device: None (Room air)    Vitals:    10/09/20 0239 10/10/20 0533   Weight: 77.1 kg (169 lb 14.4 oz) 72.1 kg (159 lb)     Vital Signs with Ranges  Temp:  [95.5  F (35.3  C)-98.4  F (36.9  C)] 97.5  F (36.4  C)  Pulse:  [] 70  Resp:  [14-20] 14  BP: (127-159)/(68-80) 129/68  SpO2:  [93 %-96 %] 93 %  I/O last 3 completed shifts:  In: 480 [P.O.:480]  Out: 50 [Urine:50]      Constitutional:Awake, alert, no apparent distress  Respiratory:  Normal work of breathing. Lungs clear to auscultation bilaterally, no crackles or wheezing.  Cardiovascular: Regular rate and rhythm, normal S1 and S2,  and no murmur appreciated.   GI: Normal bowel sounds, soft, non-distended, non-tender.   Skin/Integument: No rashes, no cyanosis, no peripheral edema.  Neuro: Moving all extremities with normal strength. Coordination and sensation grossly intact. Speech clear. No focal deficits.   Psych: Appropriate affect.          Medications       amLODIPine  2.5 mg Oral Daily     atorvastatin  20 mg Oral At Bedtime     divalproex sodium delayed-release  125 mg Oral TID     DULoxetine  60 mg Oral Daily     gabapentin  50 mg Oral 4x Daily     mirtazapine  15 mg Oral At Bedtime     QUEtiapine  50 mg Oral Daily     sodium chloride (PF)  3 mL Intracatheter Q8H       Data   Recent Labs   Lab 10/10/20  0724 10/09/20  0644   WBC 3.9* 3.6*   HGB 11.2* 11.4*   * 104*   * 115*    137   POTASSIUM 4.4 4.5   CHLORIDE 106 104   CO2 27 25   BUN 20 22   CR 1.02 1.23*   ANIONGAP 5 8   SANDRITA 8.3* 8.3*   GLC 84 84       No results found for this or any previous visit (from the past 24 hour(s)).

## 2020-10-10 NOTE — PLAN OF CARE
BP (!) 148/78 (BP Location: Left arm)   Pulse 103   Temp 96.4  F (35.8  C) (Oral)   Resp 20   Wt 77.1 kg (169 lb 14.4 oz)   SpO2 96%     Neuro: Alert, orientated to self disorientated to place and forgetful about situation.   Cardiac: WNL  Lungs: diminished, productive cough.    GI: still having loose stools  : WNL  Pain: Denies   IV: SL   Labs/tests: Recheck labs in morning.  Diet: Regular   Activity: SBA w/walker   Misc: COVID +   Plan: [facility] does not take admissions on weekends.     Patient is stable and on R/A. Special precuations maintained. Will continue to monitor and provide cares.

## 2020-10-10 NOTE — PLAN OF CARE
Pt A&O to self. Denies pain or SOB. C/o frequent, non-productive cough, well controlled w/ PRN robitussin. Pt up w/ SBA w/ walker. Has been continent w/o stools this shift. PIV x2 SL. Will continue to monitor.

## 2020-10-11 LAB
BASOPHILS # BLD AUTO: 0 10E9/L (ref 0–0.2)
BASOPHILS NFR BLD AUTO: 0.6 %
DIFFERENTIAL METHOD BLD: ABNORMAL
EOSINOPHIL # BLD AUTO: 0.1 10E9/L (ref 0–0.7)
EOSINOPHIL NFR BLD AUTO: 1.6 %
ERYTHROCYTE [DISTWIDTH] IN BLOOD BY AUTOMATED COUNT: 14 % (ref 10–15)
HCT VFR BLD AUTO: 36.8 % (ref 35–47)
HGB BLD-MCNC: 11.3 G/DL (ref 11.7–15.7)
IMM GRANULOCYTES # BLD: 0 10E9/L (ref 0–0.4)
IMM GRANULOCYTES NFR BLD: 0.9 %
LYMPHOCYTES # BLD AUTO: 1.4 10E9/L (ref 0.8–5.3)
LYMPHOCYTES NFR BLD AUTO: 42.9 %
MCH RBC QN AUTO: 32 PG (ref 26.5–33)
MCHC RBC AUTO-ENTMCNC: 30.7 G/DL (ref 31.5–36.5)
MCV RBC AUTO: 104 FL (ref 78–100)
MONOCYTES # BLD AUTO: 0.6 10E9/L (ref 0–1.3)
MONOCYTES NFR BLD AUTO: 19.7 %
NEUTROPHILS # BLD AUTO: 1.1 10E9/L (ref 1.6–8.3)
NEUTROPHILS NFR BLD AUTO: 34.3 %
NRBC # BLD AUTO: 0 10*3/UL
NRBC BLD AUTO-RTO: 0 /100
PLATELET # BLD AUTO: 123 10E9/L (ref 150–450)
RBC # BLD AUTO: 3.53 10E12/L (ref 3.8–5.2)
WBC # BLD AUTO: 3.2 10E9/L (ref 4–11)

## 2020-10-11 PROCEDURE — 120N000004 HC R&B MS OVERFLOW

## 2020-10-11 PROCEDURE — 36415 COLL VENOUS BLD VENIPUNCTURE: CPT | Performed by: PHYSICIAN ASSISTANT

## 2020-10-11 PROCEDURE — 99207: CPT | Performed by: PHYSICIAN ASSISTANT

## 2020-10-11 PROCEDURE — 85025 COMPLETE CBC W/AUTO DIFF WBC: CPT | Performed by: PHYSICIAN ASSISTANT

## 2020-10-11 PROCEDURE — 250N000013 HC RX MED GY IP 250 OP 250 PS 637: Performed by: PHYSICIAN ASSISTANT

## 2020-10-11 PROCEDURE — 99232 SBSQ HOSP IP/OBS MODERATE 35: CPT | Performed by: PHYSICIAN ASSISTANT

## 2020-10-11 RX ADMIN — DIVALPROEX SODIUM 125 MG: 125 CAPSULE, COATED PELLETS ORAL at 14:20

## 2020-10-11 RX ADMIN — GABAPENTIN 50 MG: 250 SUSPENSION ORAL at 17:56

## 2020-10-11 RX ADMIN — ATORVASTATIN CALCIUM 20 MG: 20 TABLET, FILM COATED ORAL at 21:55

## 2020-10-11 RX ADMIN — DIVALPROEX SODIUM 125 MG: 125 CAPSULE, COATED PELLETS ORAL at 21:55

## 2020-10-11 RX ADMIN — GABAPENTIN 50 MG: 250 SUSPENSION ORAL at 11:11

## 2020-10-11 RX ADMIN — QUETIAPINE 50 MG: 50 TABLET ORAL at 10:03

## 2020-10-11 RX ADMIN — GABAPENTIN 50 MG: 250 SUSPENSION ORAL at 21:55

## 2020-10-11 RX ADMIN — MIRTAZAPINE 15 MG: 15 TABLET, FILM COATED ORAL at 21:55

## 2020-10-11 RX ADMIN — AMLODIPINE BESYLATE 2.5 MG: 2.5 TABLET ORAL at 10:03

## 2020-10-11 RX ADMIN — DULOXETINE 60 MG: 60 CAPSULE, DELAYED RELEASE ORAL at 10:03

## 2020-10-11 RX ADMIN — GABAPENTIN 50 MG: 250 SUSPENSION ORAL at 14:20

## 2020-10-11 RX ADMIN — DIVALPROEX SODIUM 125 MG: 125 CAPSULE, COATED PELLETS ORAL at 10:03

## 2020-10-11 NOTE — PROGRESS NOTES
Vitals: wnl, afebrile today  Neuro: Patient oriented to self, situation, and place. Disoriented to date. Patient pleasantly confused. Bed alarm on.    GI: wnl, continent   : 3 soft stools today (not runny or diarrhea). Continent. C.diff and enteric neg.   Skin: wnl, bruised upper extremities   Activity: SBA with walker  Diet: regular  Pain: denies  Plan: discharge to facility on Monday via [facility] at 10 am

## 2020-10-11 NOTE — PLAN OF CARE
BP (!) 141/72 (BP Location: Left arm)   Pulse 58   Temp 97.6  F (36.4  C) (Oral)   Resp 14   Wt 72.1 kg (159 lb)   SpO2 94%     Neuro: Alert, orientates to self, disorientated to place and forgetful about situation.   Cardiac: WNL  Lungs: diminished  GI: WNL  : WNL  Pain: denies   IV: SL x 2   Labs/tests: recheck Labs this morning   Diet: Regular  Activity: SBA w/walker  Misc: COVID +, precautions maintained   Plan: [facility] Monday, do not take admissions on the weekend.     Will continue to monitor and provide cares.      Andrae Zaragoza RN

## 2020-10-11 NOTE — PLAN OF CARE
BP (!) 143/79 (BP Location: Left arm)   Pulse 67   Temp 99.3  F (37.4  C) (Oral)   Resp 16   Wt 72.1 kg (159 lb)   SpO2 94%     Neuro: Alert, orientated to self disorientated to place and forgetful about situation.   Cardiac: WNL  Lungs: diminished, productive cough.    GI: WNL  : WNL  Pain: Denies   IV: SL   Labs/tests: Recheck labs in morning.  Diet: Regular   Activity: SBA w/walker   Misc: COVID +   Plan: [facility] does not take admissions on weekends.     Patient is stable and on R/A. Special precuations maintained. Will continue to monitor and provide cares.

## 2020-10-11 NOTE — PROGRESS NOTES
[facility]    Hospitalist Progress Note      Assessment & Plan   Mercdees Fraire is a 88 year old female with PMhx of aortic stenosis (s/p AVR), CAD (s/p CABG), OA, anxiety, diverticulitis, colon cancer, renal cell ca, HTN, osteoporosis, dyslipidemia, IBS, osteoporosis, who was admitted to observation from outside ED for cough, diarrhea and probable COVID-19 exposure.    Active Hospital Problems    #COVID 19 Virus Infection  Presented with mild hypoxemic respiratory failure.  Oxygen saturations have been remaining stable between 90 to 95% on room air since admission.  Presenting symptoms included generalized weakness, cough, non-bloody loose stools. Initial symptom onset ~Wednesday 10/7.  No leukocytosis, systemically non toxic appearing. Inflammatory markers were not significantly elevated on admission, including procal. No pneumonia or opacities on repeated CXR. At this point appears to be uncomplicated case without significant pulmonary involvement.   Patient resides in a facility with multiple recent COVID-19 cases.    -Continue hold of abx as no clear sign of superimposed bacterial infection at this point and clinically stable  -if hypoxemic, clinically worsening would repeat CXR, start steroid therapy, consider transfer to [facility]  -spot check amb O2  -Supportive cares with encourage p.o. fluids, acetaminophen, Robitussin for cough, prn albuterol   -Medically stable for discharge however unable to return to assisted living. Social work consulted to assist in finding short-term TCU to meet quarantine requirements. Bed unavailable at this time.   -continue contact precautions   -d dimer low, PCD's and ambulation for DVT ppx  -discontinue enteric precautions, c.diff and enteric panel neg.     #Renal Insufficiency   Creatinine 1.02, 1.23 on admission.  No recent data points to review but creatinine appears to fall around 1.2. Volume status appropriate. Does not appear to be taking NSAIDs.     -Repeat BMP with am labs.    -Avoid nephrotoxic agents as able.      Chronic Medical Conditions     #CAD s/p CABG, AORTIC STENOSIS s/p AVR: Appears stable. No cardiac complaints. Volume status appears appropriate. Unable to review any recent medical records or documentation from Cardiologist at this time.  #Dementia: Appears to be at baseline per discussion over the phone with daughter. Calm and cooperative. Intermittently oriented to situation.  #Hypertension: Normotensive. Resume PTA amlodipine.  #Hyperlipidemia: Resume statin.  #Anxiety: Resume prior to admission Depakote, Cymbalta, gabapentin, seroquel. Should have f/up with psychiatry/PCP by prescribing provider for outpt monitoring.        DVT Prophylaxis: Pneumatic Compression Devices  Code Status: No CPR- Do NOT Intubate  Expected discharge: Monday when TCU bed available. SW consulted and following.      Family updated with plan of care: Camille, via phone.  Discussed transfer to TCU of which she is agreeable.  COVID PCR TESTING Status: Positive.     Sariah Moss PA-C  Text Page (7am - 5pm, M-F)    Interval History   Afebrile.  Patient states she is feeling improved today.  She visibly appears more interactive and animated.  Well tolerating normal diet.  Had two non-formed stools this morning.  No new incontinence.  No abdominal pain or vomiting.  She does have an intermittent nonproductive cough.  Denies shortness of breath of chest pain. No myalgias or rigors.     -Data reviewed today: I reviewed all new labs and imaging results over the last 24 hours.     Physical Exam   Temp: 98  F (36.7  C) Temp src: Oral BP: (!) 145/86 Pulse: 68   Resp: 16 SpO2: 92 % O2 Device: None (Room air)    Vitals:    10/09/20 0239 10/10/20 0533   Weight: 77.1 kg (169 lb 14.4 oz) 72.1 kg (159 lb)     Vital Signs with Ranges  Temp:  [96.4  F (35.8  C)-99.3  F (37.4  C)] 98  F (36.7  C)  Pulse:  [53-72] 68  Resp:  [14-16] 16  BP: (136-156)/(71-86) 145/86  SpO2:  [92 %-95 %] 92  %  No intake/output data recorded.      Constitutional:Awake, more interactive and alert today.   Respiratory:  Normal work of breathing. No crackles or wheezing.   Cardiovascular: Regular rate and rhythm, normal S1 and S2, and no murmur appreciated.   GI: Normal bowel sounds, soft, non-distended, non-tender.   Skin/Integument: No rashes, no cyanosis, no peripheral edema.  Neuro: Moving all extremities with normal strength. Coordination and sensation grossly intact. Speech clear. No focal deficits.  Oriented to self. Knows she is in the hospital with COVID.  Psych: Appropriate affect.        Medications       amLODIPine  2.5 mg Oral Daily     atorvastatin  20 mg Oral At Bedtime     divalproex sodium delayed-release  125 mg Oral TID     DULoxetine  60 mg Oral Daily     gabapentin  50 mg Oral 4x Daily     mirtazapine  15 mg Oral At Bedtime     QUEtiapine  50 mg Oral Daily     sodium chloride (PF)  3 mL Intracatheter Q8H       Data   Recent Labs   Lab 10/11/20  0558 10/10/20  0724 10/09/20  0644   WBC 3.2* 3.9* 3.6*   HGB 11.3* 11.2* 11.4*   * 105* 104*   * 120* 115*   NA  --  138 137   POTASSIUM  --  4.4 4.5   CHLORIDE  --  106 104   CO2  --  27 25   BUN  --  20 22   CR  --  1.02 1.23*   ANIONGAP  --  5 8   SANDRITA  --  8.3* 8.3*   GLC  --  84 84

## 2020-10-12 VITALS
OXYGEN SATURATION: 95 % | HEART RATE: 73 BPM | TEMPERATURE: 95.5 F | SYSTOLIC BLOOD PRESSURE: 131 MMHG | WEIGHT: 159 LBS | RESPIRATION RATE: 16 BRPM | DIASTOLIC BLOOD PRESSURE: 72 MMHG

## 2020-10-12 LAB
BASOPHILS # BLD AUTO: 0 10E9/L (ref 0–0.2)
BASOPHILS NFR BLD AUTO: 0.7 %
DIFFERENTIAL METHOD BLD: ABNORMAL
EOSINOPHIL # BLD AUTO: 0.1 10E9/L (ref 0–0.7)
EOSINOPHIL NFR BLD AUTO: 1.7 %
ERYTHROCYTE [DISTWIDTH] IN BLOOD BY AUTOMATED COUNT: 14 % (ref 10–15)
HCT VFR BLD AUTO: 37.7 % (ref 35–47)
HGB BLD-MCNC: 11.9 G/DL (ref 11.7–15.7)
IMM GRANULOCYTES # BLD: 0 10E9/L (ref 0–0.4)
IMM GRANULOCYTES NFR BLD: 1 %
LYMPHOCYTES # BLD AUTO: 1.2 10E9/L (ref 0.8–5.3)
LYMPHOCYTES NFR BLD AUTO: 41.9 %
MCH RBC QN AUTO: 32.4 PG (ref 26.5–33)
MCHC RBC AUTO-ENTMCNC: 31.6 G/DL (ref 31.5–36.5)
MCV RBC AUTO: 103 FL (ref 78–100)
MONOCYTES # BLD AUTO: 0.5 10E9/L (ref 0–1.3)
MONOCYTES NFR BLD AUTO: 18.6 %
NEUTROPHILS # BLD AUTO: 1.1 10E9/L (ref 1.6–8.3)
NEUTROPHILS NFR BLD AUTO: 36.1 %
NRBC # BLD AUTO: 0 10*3/UL
NRBC BLD AUTO-RTO: 0 /100
PLATELET # BLD AUTO: 119 10E9/L (ref 150–450)
RBC # BLD AUTO: 3.67 10E12/L (ref 3.8–5.2)
WBC # BLD AUTO: 2.9 10E9/L (ref 4–11)

## 2020-10-12 PROCEDURE — 85025 COMPLETE CBC W/AUTO DIFF WBC: CPT | Performed by: PHYSICIAN ASSISTANT

## 2020-10-12 PROCEDURE — 36415 COLL VENOUS BLD VENIPUNCTURE: CPT | Performed by: PHYSICIAN ASSISTANT

## 2020-10-12 PROCEDURE — 99238 HOSP IP/OBS DSCHRG MGMT 30/<: CPT | Performed by: PHYSICIAN ASSISTANT

## 2020-10-12 PROCEDURE — 250N000013 HC RX MED GY IP 250 OP 250 PS 637: Performed by: PHYSICIAN ASSISTANT

## 2020-10-12 RX ADMIN — QUETIAPINE 50 MG: 50 TABLET ORAL at 10:12

## 2020-10-12 RX ADMIN — DIVALPROEX SODIUM 125 MG: 125 CAPSULE, COATED PELLETS ORAL at 13:13

## 2020-10-12 RX ADMIN — DULOXETINE 60 MG: 60 CAPSULE, DELAYED RELEASE ORAL at 10:12

## 2020-10-12 RX ADMIN — AMLODIPINE BESYLATE 2.5 MG: 2.5 TABLET ORAL at 10:12

## 2020-10-12 RX ADMIN — GABAPENTIN 50 MG: 250 SUSPENSION ORAL at 13:13

## 2020-10-12 RX ADMIN — GABAPENTIN 50 MG: 250 SUSPENSION ORAL at 10:21

## 2020-10-12 RX ADMIN — DIVALPROEX SODIUM 125 MG: 125 CAPSULE, COATED PELLETS ORAL at 10:12

## 2020-10-12 NOTE — PLAN OF CARE
Assumed care at 1900, A&Ox4, slightly forgetful and tearful this shift, provided support and reassurance, pt was unsure on how she ended up in [place], denies pain, tolerating regular diet, LS diminished, room air, congested cough, BS A&Ax4, has had soft BM, plan for discharge to [facility] tomorrow via HE stretcher at 1000, will continue to monitor and provide supportive cares.

## 2020-10-12 NOTE — PLAN OF CARE
A&Ox4, forgetful at times. Pt denies pain. Tolerating regular diet. LS diminished, room air. Denies c/o cough. Up SBA w/ walker. PIV x2 SL. Plan for discharge to [facility] today at 1000 via HE stretcher. Will continue to monitor and provide supportive cares.

## 2020-10-12 NOTE — PLAN OF CARE
B/P: 131/72, T: 95.5, P: 73, R: 16, O2: 95%    Denies pain. Patient reports fatigue, but feeling better overall. Disoriented to situation; forgetful. Up with SBA, GB & walker. Infrequent cough noted. SL x 2. One BM this shift. Plan to discharge this afternoon.

## 2020-10-12 NOTE — DISCHARGE SUMMARY
Discharge Summary  Hospitalist Service    Mercedes Fraire MRN# [id number]   YOB: 1932 Age: 88 year old     Date of Admission:  10/9/2020  Date of Discharge:  10/12/2020  Admitting Physician: Bryan Wood MD  Discharge Physician: Marcia Valdovinos PA-C  Discharging Service: Hospitalist Service     Primary Provider: No primary care provider on file.  Primary Care Physician Phone Number: None         Discharge Diagnoses/Problem Oriented Hospital Course (Providers):    Mercedes Fraire was admitted on 10/9/2020 by Bryan Wood MD and I would refer you to their history and physical. Briefly, patient was admitted with complaints of cough, hypoxia, weakness and diarrhea. COVID 19 was positive with negative chest x ray, negative procalcitonin, normal CRP and elevated D dimer at 0.8. LFTS were not checked. Reportedly, multiple residents at her care facility also had COVID 19. She was treated supportively and did not become very ill. She was discharged to a rehab center that accepted COVID patients.            Code Status:      DNR / DNI        Brief Hospital Stay Summary Sent Home With Patient in AVS:        Reason for your hospital stay      Patient admitted with diarrhea, cough, hypoxia and weakness. She tested   positive for COVID 19.                           Pending Results:        Unresulted Labs Ordered in the Past 30 Days of this Admission     No orders found from 9/9/2020 to 10/10/2020.            Discharge Instructions and Follow-Up:      Follow-up Appointments     Follow Up and recommended labs and tests      Follow up with shelter physician.  The following labs/tests are   recommended: BMP and CBC               Discharge Disposition:      Discharged to home         Discharge Medications:        Current Discharge Medication List      START taking these medications    Details   albuterol (PROAIR HFA/PROVENTIL HFA/VENTOLIN HFA) 108 (90 Base) MCG/ACT inhaler Inhale 2 puffs into the lungs  every 6 hours as needed for wheezing  Qty:      Comments: Pharmacy may dispense brand covered by insurance (Proair, or proventil or ventolin or generic albuterol inhaler)  Associated Diagnoses: Cough      guaiFENesin-dextromethorphan (ROBITUSSIN DM) 100-10 MG/5ML syrup Take 5 mLs by mouth every 4 hours as needed for cough  Qty:      Associated Diagnoses: Cough      rivaroxaban ANTICOAGULANT (XARELTO ANTICOAGULANT) 10 MG TABS tablet Take 1 tablet (10 mg) by mouth daily (with dinner)  Qty: 14 tablet, Refills: 0    Associated Diagnoses: 2019 novel coronavirus disease (COVID-19)         CONTINUE these medications which have NOT CHANGED    Details   acetaminophen (TYLENOL) 325 MG tablet Take 650 mg by mouth every 4 hours as needed for mild pain      amLODIPine (NORVASC) 2.5 MG tablet Take 2.5 mg by mouth daily      atorvastatin (LIPITOR) 20 MG tablet Take 20 mg by mouth At Bedtime      divalproex sodium delayed-release (DEPAKOTE SPRINKLE) 125 MG DR capsule Take 125 mg by mouth 3 times daily Formulation confirmed by pt's pharmacy*      DULoxetine (CYMBALTA) 60 MG capsule Take 60 mg by mouth daily      !! gabapentin (NEURONTIN) 250 MG/5ML solution Take 50 mg by mouth 4 times daily      !! gabapentin (NEURONTIN) 250 MG/5ML solution Take 50 mg by mouth every 2 hours as needed for other (anxiety, pain, restlessness) Max 6ml/day*      mirtazapine (REMERON) 15 MG tablet Take 15 mg by mouth At Bedtime      !! QUEtiapine (SEROQUEL) 50 MG tablet Take 50 mg by mouth daily      !! QUEtiapine (SEROQUEL) 50 MG tablet Take 50 mg by mouth every 4 hours as needed (paranoia, agitation) Max 3 prn doses/24hrs.      traZODone (DESYREL) 50 MG tablet Take 50 mg by mouth nightly as needed for sleep MR x1 after 30min before 3am for sleeplessness      vitamin D3 (CHOLECALCIFEROL) 50 mcg (2000 units) tablet Take 1 tablet by mouth daily       !! - Potential duplicate medications found. Please discuss with provider.            Allergies:       Not  on File        Consultations This Hospital Stay:      No consultations were requested during this admission         Condition and Physical on Discharge:      Discharge condition: Stable   Vitals: Blood pressure 114/61, pulse 72, temperature 98.1  F (36.7  C), temperature source Oral, resp. rate 18, weight 72.1 kg (159 lb), SpO2 92 %.     Constitutional: Alert but not orientated completely   Lungs: CTAB   Cardiovascular: RRR with no murmur   Abdomen: Bowel sounds are present without tenderness to palpation   Skin: No rash or open sores   Other:          Discharge Time:      Less than 30 minutes.        Image Results From This Hospital Stay (For Non-EPIC Providers):        Results for orders placed or performed during the hospital encounter of 10/09/20   XR Chest Port 1 View    Narrative    EXAM: XR CHEST PORT 1 VW  LOCATION: [facility]  DATE/TIME: 10/9/2020 4:56 AM    INDICATION: Shortness of breath, cough  COMPARISON: None.      Impression    IMPRESSION: Multiple median sternotomy wires. Prosthetic aortic valve. Cardiac silhouette within normal limits. Tortuous atherosclerotic aorta. No pneumothorax or pleural effusion. No acute osseous abnormality. Diffuse osteopenia.           Most Recent Lab Results In EPIC (For Non-EPIC Providers):    Most Recent 3 CBC's:  Recent Labs   Lab Test 10/12/20  0629 10/11/20  0558 10/10/20  0724   WBC 2.9* 3.2* 3.9*   HGB 11.9 11.3* 11.2*   * 104* 105*   * 123* 120*      Most Recent 3 BMP's:  Recent Labs   Lab Test 10/10/20  0724 10/09/20  0644    137   POTASSIUM 4.4 4.5   CHLORIDE 106 104   CO2 27 25   BUN 20 22   CR 1.02 1.23*   ANIONGAP 5 8   SANDRITA 8.3* 8.3*   GLC 84 84     Most Recent 3 Troponin's:No lab results found.  Most Recent 3 INR's:No lab results found.  Most Recent 2 LFT's:No lab results found.  Most Recent Cholesterol Panel:No lab results found.  Most Recent 6 Bacteria Isolates From Any Culture (See EPIC Reports for Culture Details):No lab  results found.  Most Recent TSH, T4 and HgbA1c: No lab results found.

## 2020-10-12 NOTE — PLAN OF CARE
Patient's After Visit Summary was reviewed with patient. Daughter updated by SW.   Patient verbalized understanding of After Visit Summary, recommended follow up and was given an opportunity to ask questions.   Discharge medications sent home with patient/family: No, discharged to [facility].   Discharged with HE Stretcher Transport.    Discharged in stable condition with HE stretcher transport.

## 2020-10-12 NOTE — PROGRESS NOTES
Care Management Discharge Note    Discharge Planning:  Expected Discharge Date: 10/12/20    Anticipated Discharge Disposition: [facility], today. PAS completed. Discharge orders faxed.   Patient/Family in Agreement with the Plan:  Yes     Disposition Comments:  Stretcher transport arranged for 1400 today. PCS form completed and placed in chart. Dtr Camlile updated on discharge plans.     Your information has been submitted on October 12th, 2020 at 09:34:10 AM CDT. The confirmation number is [id number]      LISA Serrano

## 2020-10-13 ENCOUNTER — VIRTUAL VISIT (OUTPATIENT)
Dept: GERIATRICS | Facility: CLINIC | Age: 85
End: 2020-10-13
Payer: MEDICARE

## 2020-10-13 VITALS
DIASTOLIC BLOOD PRESSURE: 84 MMHG | WEIGHT: 165.5 LBS | SYSTOLIC BLOOD PRESSURE: 158 MMHG | OXYGEN SATURATION: 96 % | HEIGHT: 68 IN | TEMPERATURE: 98.9 F | BODY MASS INDEX: 25.08 KG/M2 | HEART RATE: 60 BPM | RESPIRATION RATE: 18 BRPM

## 2020-10-13 DIAGNOSIS — F03.91 DEMENTIA WITH BEHAVIORAL DISTURBANCE, UNSPECIFIED DEMENTIA TYPE: ICD-10-CM

## 2020-10-13 DIAGNOSIS — I25.10 CORONARY ARTERY DISEASE INVOLVING NATIVE CORONARY ARTERY OF NATIVE HEART WITHOUT ANGINA PECTORIS: ICD-10-CM

## 2020-10-13 DIAGNOSIS — U07.1 INFECTION DUE TO 2019 NOVEL CORONAVIRUS: Primary | ICD-10-CM

## 2020-10-13 DIAGNOSIS — I10 ESSENTIAL HYPERTENSION: ICD-10-CM

## 2020-10-13 DIAGNOSIS — F41.9 ANXIETY DISORDER, UNSPECIFIED TYPE: ICD-10-CM

## 2020-10-13 PROBLEM — S32.020A CLOSED COMPRESSION FRACTURE OF L2 VERTEBRA (H): Status: ACTIVE | Noted: 2018-10-19

## 2020-10-13 PROBLEM — M35.3 POLYMYALGIA RHEUMATICA (H): Status: ACTIVE | Noted: 2018-03-21

## 2020-10-13 PROCEDURE — 99309 SBSQ NF CARE MODERATE MDM 30: CPT | Mod: 95 | Performed by: NURSE PRACTITIONER

## 2020-10-13 ASSESSMENT — MIFFLIN-ST. JEOR: SCORE: 1229.2

## 2020-10-13 NOTE — LETTER
"    10/13/2020        RE: Mercedes Cazares [place]  [address] [facility] [address]         [facility]  Mercedes Fraire is being evaluated via a billable video.   The patient has been notified of following:  \"This video visit will be conducted via a call between you and your provider. We have found that certain health care needs can be provided without the need for an in-person physical exam.  This service lets us provide the care you need with a video conversation. If during the course of the call the provider feels a video visit is not appropriate, you will not be charged for this service.\"   The provider has received verbal consent for a Video Visit from the patient and or first contact? Yes  Patient/facility staff would like the video invitation sent by: N/A   Video Start Time: 11:08am  Which Facility the Patient is at during the time of visit: [facility]    PRIMARY CARE PROVIDER AND CLINIC:  Que Foote MD, MD, [facility] [address] [place] [id number]  Chief Complaint   Patient presents with     Hospital F/U     [facility] Medical Record Number:  [id number]  Mercedes Fraire  is a 88 year old  (3/29/1932), admitted to the above facility from  [facility]. Hospital stay 10/9/20 through 10/12/20..  Admitted to this facility for  rehab, medical management and nursing care.  HPI:    HPI information obtained from: facility chart records, facility staff, patient report and [facility] chart review.     Brief Summary of Hospital Course: PMH dementia, aortic stenosis s/p AVR, anxiety, HTN, HLD, CAD s/p CABG, colon cancer, renal cell cancer, IBS, and OA. She presented with cough, hypoxia and diarrhea. Tested positive for COVID-19. She did not require supplemental oxygen. She did not receive decadron or remdesivir.     Updates on Status Since Skilled nursing Admission:   Upon entering the room, patient had a prolonged coughing fit. She said that she " had a tickle in her throat, but said she actually hasn't been coughing much. No fever, chills, SOB, N/V. She says her appetite is fine.      CODE STATUS/ADVANCE DIRECTIVES DISCUSSION:   DNR / DNI  Patient's living condition: lives in an assisted living facility  ALLERGIES: Buspirone, Clindamycin hcl [clindamycin], Hydralazine, Lisinopril, Losartan, and Zocor [simvastatin]  PAST MEDICAL HISTORY:  has no past medical history on file.  PAST SURGICAL HISTORY:   has no past surgical history on file.  FAMILY HISTORY: family history is not on file.  SOCIAL HISTORY:     Current Outpatient Medications   Medication Sig Dispense Refill     acetaminophen (TYLENOL) 325 MG tablet Take 650 mg by mouth every 4 hours as needed for mild pain       albuterol (PROAIR HFA/PROVENTIL HFA/VENTOLIN HFA) 108 (90 Base) MCG/ACT inhaler Inhale 2 puffs into the lungs every 6 hours as needed for wheezing       amLODIPine (NORVASC) 2.5 MG tablet Take 2.5 mg by mouth daily       atorvastatin (LIPITOR) 20 MG tablet Take 20 mg by mouth At Bedtime       divalproex sodium delayed-release (DEPAKOTE SPRINKLE) 125 MG DR capsule Take 125 mg by mouth 3 times daily Formulation confirmed by pt's pharmacy*       DULoxetine (CYMBALTA) 60 MG capsule Take 60 mg by mouth daily       gabapentin (NEURONTIN) 250 MG/5ML solution Take 50 mg by mouth 4 times daily       gabapentin (NEURONTIN) 250 MG/5ML solution Take 50 mg by mouth every 2 hours as needed for other (anxiety, pain, restlessness) Max 6ml/day*       guaiFENesin-dextromethorphan (ROBITUSSIN DM) 100-10 MG/5ML syrup Take 5 mLs by mouth every 4 hours as needed for cough       mirtazapine (REMERON) 15 MG tablet Take 15 mg by mouth At Bedtime       QUEtiapine (SEROQUEL) 50 MG tablet Take 50 mg by mouth daily       QUEtiapine (SEROQUEL) 50 MG tablet Take 50 mg by mouth every 4 hours as needed (paranoia, agitation) Max 3 prn doses/24hrs.       rivaroxaban ANTICOAGULANT (XARELTO ANTICOAGULANT) 10 MG TABS tablet  "Take 1 tablet (10 mg) by mouth daily (with dinner) 14 tablet 0     traZODone (DESYREL) 50 MG tablet Take 50 mg by mouth nightly as needed for sleep MR x1 after 30min before 3am for sleeplessness       vitamin D3 (CHOLECALCIFEROL) 50 mcg (2000 units) tablet Take 1 tablet by mouth daily        discharge medications reconciled, continue medications without change    ROS: 10 point ROS of systems including Constitutional, Eyes, Respiratory, Cardiovascular, Gastroenterology, Genitourinary, Integumentary, Musculoskeletal, Psychiatric were all negative except for pertinent positives noted in my HPI.    Vitals:BP (!) 158/84   Pulse 60   Temp 98.9  F (37.2  C)   Resp 18   Ht 1.727 m (5' 8\")   Wt 75.1 kg (165 lb 8 oz)   SpO2 96%   BMI 25.16 kg/m     Limited Visit Exam done given COVID-19 precautions:  GENERAL APPEARANCE:  Alert, once she stopped coughing, she did not appear in any distress  EYES:  Conjunctiva and lids normal  RESP:  no respiratory distress, dry, harsh cough  PSYCH:  oriented X 3, insight and judgement impaired, memory impaired , affect abnormal flat    Lab/Diagnostic data:  Recent labs in Outlisten reviewed by me today.     ASSESSMENT/PLAN:  (U07.1) Infection due to 2019 novel coronavirus  (primary encounter diagnosis)  Comment: Her coughing fit may have not been related, but will continue to monitor respiratory status. Otherwise, her illness has not been severe.   Plan: Continue current POC with no changes at this time and adjustments as needed.    (F03.91) Dementia with behavioral disturbance, unspecified dementia type (H)  (F41.9) Anxiety disorder, unspecified type  Comment: Patient is on several psychotropic medications, but does not appear to have significant side effects. Was calm during visit.   Plan: Continue current POC with no changes at this time and adjustments as needed. Monitor for behavioral disturbances.    (I10) Essential hypertension  Comment: BP elevated today, possible related to being " moved to new facility, anxiety. To avoid risk of hypotension, falls, dizziness and tissue hypoperfusion, recommend  BP goal is < 150/90mmHg.  Plan: Continue current POC with no changes at this time and adjustments as needed.    (I25.10) Coronary artery disease involving native coronary artery of native heart without angina pectoris  Comment: Asymptomatic  Plan: Continue secondary prevention      Electronically signed by:  PETER Ham CNP   [facility]  Phone: [phone]      Video-Visit Details  Type of service:  Video Visit  Video End Time (time video stopped): 11:14am  Distant Location (provider location):  [facility]

## 2020-10-13 NOTE — PROGRESS NOTES
" [facility]  Mercedes Fraire is being evaluated via a billable video.   The patient has been notified of following:  \"This video visit will be conducted via a call between you and your provider. We have found that certain health care needs can be provided without the need for an in-person physical exam.  This service lets us provide the care you need with a video conversation. If during the course of the call the provider feels a video visit is not appropriate, you will not be charged for this service.\"   The provider has received verbal consent for a Video Visit from the patient and or first contact? Yes  Patient/facility staff would like the video invitation sent by: N/A   Video Start Time: 11:08am  Which Facility the Patient is at during the time of visit: [facility]    PRIMARY CARE PROVIDER AND CLINIC:  Que Foote MD, MD, [facility] [address] [address]  Chief Complaint   Patient presents with     Hospital F/U     [place] Medical Record Number:  [id number]  Mercedes Fraire  is a 88 year old  (3/29/1932), admitted to the above facility from  [facility]. Hospital stay 10/9/20 through 10/12/20..  Admitted to this facility for  rehab, medical management and nursing care.  HPI:    HPI information obtained from: facility chart records, facility staff, patient report and [facility] chart review.     Brief Summary of Hospital Course: PMH dementia, aortic stenosis s/p AVR, anxiety, HTN, HLD, CAD s/p CABG, colon cancer, renal cell cancer, IBS, and OA. She presented with cough, hypoxia and diarrhea. Tested positive for COVID-19. She did not require supplemental oxygen. She did not receive decadron or remdesivir.     Updates on Status Since Skilled nursing Admission:   Upon entering the room, patient had a prolonged coughing fit. She said that she had a tickle in her throat, but said she actually hasn't been coughing much. No fever, chills, SOB, N/V. " She says her appetite is fine.      CODE STATUS/ADVANCE DIRECTIVES DISCUSSION:   DNR / DNI  Patient's living condition: lives in an assisted living facility  ALLERGIES: Buspirone, Clindamycin hcl [clindamycin], Hydralazine, Lisinopril, Losartan, and Zocor [simvastatin]  PAST MEDICAL HISTORY:  has no past medical history on file.  PAST SURGICAL HISTORY:   has no past surgical history on file.  FAMILY HISTORY: family history is not on file.  SOCIAL HISTORY:     Current Outpatient Medications   Medication Sig Dispense Refill     acetaminophen (TYLENOL) 325 MG tablet Take 650 mg by mouth every 4 hours as needed for mild pain       albuterol (PROAIR HFA/PROVENTIL HFA/VENTOLIN HFA) 108 (90 Base) MCG/ACT inhaler Inhale 2 puffs into the lungs every 6 hours as needed for wheezing       amLODIPine (NORVASC) 2.5 MG tablet Take 2.5 mg by mouth daily       atorvastatin (LIPITOR) 20 MG tablet Take 20 mg by mouth At Bedtime       divalproex sodium delayed-release (DEPAKOTE SPRINKLE) 125 MG DR capsule Take 125 mg by mouth 3 times daily Formulation confirmed by pt's pharmacy*       DULoxetine (CYMBALTA) 60 MG capsule Take 60 mg by mouth daily       gabapentin (NEURONTIN) 250 MG/5ML solution Take 50 mg by mouth 4 times daily       gabapentin (NEURONTIN) 250 MG/5ML solution Take 50 mg by mouth every 2 hours as needed for other (anxiety, pain, restlessness) Max 6ml/day*       guaiFENesin-dextromethorphan (ROBITUSSIN DM) 100-10 MG/5ML syrup Take 5 mLs by mouth every 4 hours as needed for cough       mirtazapine (REMERON) 15 MG tablet Take 15 mg by mouth At Bedtime       QUEtiapine (SEROQUEL) 50 MG tablet Take 50 mg by mouth daily       QUEtiapine (SEROQUEL) 50 MG tablet Take 50 mg by mouth every 4 hours as needed (paranoia, agitation) Max 3 prn doses/24hrs.       rivaroxaban ANTICOAGULANT (XARELTO ANTICOAGULANT) 10 MG TABS tablet Take 1 tablet (10 mg) by mouth daily (with dinner) 14 tablet 0     traZODone (DESYREL) 50 MG tablet Take 50  "mg by mouth nightly as needed for sleep MR x1 after 30min before 3am for sleeplessness       vitamin D3 (CHOLECALCIFEROL) 50 mcg (2000 units) tablet Take 1 tablet by mouth daily        discharge medications reconciled, continue medications without change    ROS: 10 point ROS of systems including Constitutional, Eyes, Respiratory, Cardiovascular, Gastroenterology, Genitourinary, Integumentary, Musculoskeletal, Psychiatric were all negative except for pertinent positives noted in my HPI.    Vitals:BP (!) 158/84   Pulse 60   Temp 98.9  F (37.2  C)   Resp 18   Ht 1.727 m (5' 8\")   Wt 75.1 kg (165 lb 8 oz)   SpO2 96%   BMI 25.16 kg/m     Limited Visit Exam done given COVID-19 precautions:  GENERAL APPEARANCE:  Alert, once she stopped coughing, she did not appear in any distress  EYES:  Conjunctiva and lids normal  RESP:  no respiratory distress, dry, harsh cough  PSYCH:  oriented X 3, insight and judgement impaired, memory impaired , affect abnormal flat    Lab/Diagnostic data:  Recent labs in [facility] reviewed by me today.     ASSESSMENT/PLAN:  (U07.1) Infection due to 2019 novel coronavirus  (primary encounter diagnosis)  Comment: Her coughing fit may have not been related, but will continue to monitor respiratory status. Otherwise, her illness has not been severe.   Plan: Continue current POC with no changes at this time and adjustments as needed.    (F03.91) Dementia with behavioral disturbance, unspecified dementia type (H)  (F41.9) Anxiety disorder, unspecified type  Comment: Patient is on several psychotropic medications, but does not appear to have significant side effects. Was calm during visit.   Plan: Continue current POC with no changes at this time and adjustments as needed. Monitor for behavioral disturbances.    (I10) Essential hypertension  Comment: BP elevated today, possible related to being moved to new facility, anxiety. To avoid risk of hypotension, falls, dizziness and tissue hypoperfusion, " recommend  BP goal is < 150/90mmHg.  Plan: Continue current POC with no changes at this time and adjustments as needed.    (I25.10) Coronary artery disease involving native coronary artery of native heart without angina pectoris  Comment: Asymptomatic  Plan: Continue secondary prevention      Electronically signed by:  PETER Ham CNP   [facility]  Phone: [phone]      Video-Visit Details  Type of service:  Video Visit  Video End Time (time video stopped): 11:14am  Distant Location (provider location):  [facility]

## 2020-10-14 ENCOUNTER — AMBULATORY - HEALTHEAST (OUTPATIENT)
Dept: OTHER | Facility: CLINIC | Age: 85
End: 2020-10-14

## 2020-10-14 ENCOUNTER — DOCUMENTATION ONLY (OUTPATIENT)
Dept: OTHER | Facility: CLINIC | Age: 85
End: 2020-10-14

## 2020-10-16 ENCOUNTER — TRANSFERRED RECORDS (OUTPATIENT)
Dept: MULTI SPECIALTY CLINIC | Facility: CLINIC | Age: 85
End: 2020-10-16

## 2020-10-16 LAB
CREAT SERPL-MCNC: 0.99 MG/DL (ref 0.55–1.02)
GFR SERPL CREATININE-BSD FRML MDRD: 51 ML/MIN/1.73M2
GLUCOSE SERPL-MCNC: 71 MG/DL (ref 70–100)
POTASSIUM SERPL-SCNC: 4.4 MMOL/L (ref 3.5–5.1)

## 2020-10-20 ENCOUNTER — VIRTUAL VISIT (OUTPATIENT)
Dept: GERIATRICS | Facility: CLINIC | Age: 85
End: 2020-10-20
Payer: MEDICARE

## 2020-10-20 VITALS
WEIGHT: 152.1 LBS | HEART RATE: 61 BPM | BODY MASS INDEX: 23.05 KG/M2 | TEMPERATURE: 97.8 F | SYSTOLIC BLOOD PRESSURE: 134 MMHG | HEIGHT: 68 IN | OXYGEN SATURATION: 91 % | RESPIRATION RATE: 16 BRPM | DIASTOLIC BLOOD PRESSURE: 90 MMHG

## 2020-10-20 DIAGNOSIS — U07.1 INFECTION DUE TO 2019 NOVEL CORONAVIRUS: Primary | ICD-10-CM

## 2020-10-20 DIAGNOSIS — I25.10 CORONARY ARTERY DISEASE INVOLVING NATIVE CORONARY ARTERY OF NATIVE HEART WITHOUT ANGINA PECTORIS: ICD-10-CM

## 2020-10-20 DIAGNOSIS — F03.91 DEMENTIA WITH BEHAVIORAL DISTURBANCE, UNSPECIFIED DEMENTIA TYPE: ICD-10-CM

## 2020-10-20 DIAGNOSIS — I10 ESSENTIAL HYPERTENSION: ICD-10-CM

## 2020-10-20 PROCEDURE — 99309 SBSQ NF CARE MODERATE MDM 30: CPT | Mod: 95 | Performed by: NURSE PRACTITIONER

## 2020-10-20 ASSESSMENT — MIFFLIN-ST. JEOR: SCORE: 1168.42

## 2020-10-20 NOTE — PROGRESS NOTES
"[facility]   Mercedes Fraire is being evaluated via a billable video visit.   The patient has been notified of following:  \"This video visit will be conducted via a call between you and your provider. We have found that certain health care needs can be provided without the need for an in-person physical exam.  This service lets us provide the care you need with a video conversation. If during the course of the call the provider feels a video visit is not appropriate, you will not be charged for this service.\"   The provider has received verbal consent for a Video Visit from the patient or first contact? Yes  Patient  or facility staff would like the video invitation sent by: N/A   Video Start Time: 10:58am    [facility] Medical Record Number:  [id number]  Place of Location at the time of visit: [facility]  Chief Complaint   Patient presents with     RECHECK     HPI:  Mercedes Fraire  is a 88 year old (3/29/1932), who is being seen today for a visit. [facility] stay 10/9/20 through 10/12/20. Admitted to this facility for rehab, medical management and nursing care. PMH dementia, aortic stenosis s/p AVR, anxiety, HTN, HLD, CAD s/p CABG, colon cancer, renal cell cancer, IBS, and OA. She presented with cough, hypoxia and diarrhea. Tested positive for COVID-19. She did not require supplemental oxygen. She did not receive decadron or remdesivir.       HPI information obtained from: facility chart records, facility staff and patient report.     Today's concern is:  Infection due to 2019 novel coronavirus  Patient continues to have diarrhea. Staff are also reporting notable confusion. No fever, hypoxia, SOB, cough. She says her appetite is not good.    Dementia with behavioral disturbance, unspecified dementia type (H)  Lives in [facility]. On seroquel at baseline. She has not had any agitation or falls.     Essential hypertension  -140s/70-80s    Coronary artery disease involving native " coronary artery of native heart without angina pectoris  No chest pain      Past Medical and Surgical History reviewed in Epic today.  MEDICATIONS:    Current Outpatient Medications   Medication Sig Dispense Refill     acetaminophen (TYLENOL) 325 MG tablet Take 650 mg by mouth every 4 hours as needed for mild pain       albuterol (PROAIR HFA/PROVENTIL HFA/VENTOLIN HFA) 108 (90 Base) MCG/ACT inhaler Inhale 2 puffs into the lungs every 6 hours as needed for wheezing       amLODIPine (NORVASC) 2.5 MG tablet Take 2.5 mg by mouth daily       atorvastatin (LIPITOR) 20 MG tablet Take 20 mg by mouth At Bedtime       divalproex sodium delayed-release (DEPAKOTE SPRINKLE) 125 MG DR capsule Take 125 mg by mouth 3 times daily Formulation confirmed by pt's pharmacy*       DULoxetine (CYMBALTA) 60 MG capsule Take 60 mg by mouth daily       gabapentin (NEURONTIN) 250 MG/5ML solution Take 50 mg by mouth 4 times daily       gabapentin (NEURONTIN) 250 MG/5ML solution Take 50 mg by mouth every 2 hours as needed for other (anxiety, pain, restlessness) Max 6ml/day*       guaiFENesin-dextromethorphan (ROBITUSSIN DM) 100-10 MG/5ML syrup Take 5 mLs by mouth every 4 hours as needed for cough       mirtazapine (REMERON) 15 MG tablet Take 15 mg by mouth At Bedtime       QUEtiapine (SEROQUEL) 50 MG tablet Take 50 mg by mouth daily       QUEtiapine (SEROQUEL) 50 MG tablet Take 50 mg by mouth every 4 hours as needed (paranoia, agitation) Max 3 prn doses/24hrs.       rivaroxaban ANTICOAGULANT (XARELTO ANTICOAGULANT) 10 MG TABS tablet Take 1 tablet (10 mg) by mouth daily (with dinner) 14 tablet 0     traZODone (DESYREL) 50 MG tablet Take 50 mg by mouth nightly as needed for sleep MR x1 after 30min before 3am for sleeplessness       vitamin D3 (CHOLECALCIFEROL) 50 mcg (2000 units) tablet Take 1 tablet by mouth daily       REVIEW OF SYSTEMS: Limited secondary to cognitive impairment but today pt reports 10 point ROS of systems including  "Constitutional, Eyes, Respiratory, Cardiovascular, Gastroenterology, Genitourinary, Integumentary, Musculoskeletal, Psychiatric were all negative except for pertinent positives noted in my HPI.    Objective: BP (!) 134/90   Pulse 61   Temp 97.8  F (36.6  C)   Resp 16   Ht 1.727 m (5' 8\")   Wt 69 kg (152 lb 1.6 oz)   SpO2 91%   BMI 23.13 kg/m    Limited visit exam done given COVID-19 precautions.   GENERAL APPEARANCE:  Alert, no apparent distress  EYES:  Conjunctiva and lids normal  RESP:  no respiratory distress, no cough  PSYCH:  oriented X 3, insight and judgement impaired, memory impaired , affect abnormal flat    Labs:   none    ASSESSMENT/PLAN:  (U07.1) Infection due to 2019 novel coronavirus  (primary encounter diagnosis)  Comment: Diarrhea is likely still due to COVID. She was negative for cdiff in the hospital and has not been treated with any abx. Her confusion is fairly mild and also probably a result of COVID.  Plan: Imodium prn. Monitor symptoms. Fall precautions.    (F03.91) Dementia with behavioral disturbance, unspecified dementia type (H)  Comment: Chronic, progressive. Currently acutely worse due to illness  Plan: Continue 24 hour care. Monitor for behavioral disturbances.    (I10) Essential hypertension  Comment: Controlled. To avoid risk of hypotension, falls, dizziness and tissue hypoperfusion, recommend  BP goal is < 150/90mmHg.  Plan: Continue current POC with no changes at this time and adjustments as needed.    (I25.10) Coronary artery disease involving native coronary artery of native heart without angina pectoris  Comment: Asymptomatic  Plan: Continue current POC with no changes at this time and adjustments as needed.      Electronically signed by:  PETER Ham CNP   [facility]  Phone: [phone]      Video-Visit Details  Type of service:  Video Visit  Video End Time (time video stopped): 11:01am  Distant Location (provider location):  [facility] " SERVICES

## 2020-10-20 NOTE — LETTER
"    10/20/2020        RE: Mercedes Ruiz  [address] [facility] [address]        [facility]   Mercedes Fraire is being evaluated via a billable video visit.   The patient has been notified of following:  \"This video visit will be conducted via a call between you and your provider. We have found that certain health care needs can be provided without the need for an in-person physical exam.  This service lets us provide the care you need with a video conversation. If during the course of the call the provider feels a video visit is not appropriate, you will not be charged for this service.\"   The provider has received verbal consent for a Video Visit from the patient or first contact? Yes  Patient  or facility staff would like the video invitation sent by: N/A   Video Start Time: 10:58am    [facility] Medical Record Number:  [id number]  Place of Location at the time of visit: [facility]  Chief Complaint   Patient presents with     RECHECK     HPI:  Mercedes Fraire  is a 88 year old (3/29/1932), who is being seen today for a visit. [facility] stay 10/9/20 through 10/12/20. Admitted to this facility for rehab, medical management and nursing care. PMH dementia, aortic stenosis s/p AVR, anxiety, HTN, HLD, CAD s/p CABG, colon cancer, renal cell cancer, IBS, and OA. She presented with cough, hypoxia and diarrhea. Tested positive for COVID-19. She did not require supplemental oxygen. She did not receive decadron or remdesivir.       HPI information obtained from: facility chart records, facility staff and patient report.     Today's concern is:  Infection due to 2019 novel coronavirus  Patient continues to have diarrhea. Staff are also reporting notable confusion. No fever, hypoxia, SOB, cough. She says her appetite is not good.    Dementia with behavioral disturbance, unspecified dementia type (H)  Lives in [facility]. On seroquel at baseline. She has not had any agitation " or falls.     Essential hypertension  -140s/70-80s    Coronary artery disease involving native coronary artery of native heart without angina pectoris  No chest pain      Past Medical and Surgical History reviewed in Epic today.  MEDICATIONS:    Current Outpatient Medications   Medication Sig Dispense Refill     acetaminophen (TYLENOL) 325 MG tablet Take 650 mg by mouth every 4 hours as needed for mild pain       albuterol (PROAIR HFA/PROVENTIL HFA/VENTOLIN HFA) 108 (90 Base) MCG/ACT inhaler Inhale 2 puffs into the lungs every 6 hours as needed for wheezing       amLODIPine (NORVASC) 2.5 MG tablet Take 2.5 mg by mouth daily       atorvastatin (LIPITOR) 20 MG tablet Take 20 mg by mouth At Bedtime       divalproex sodium delayed-release (DEPAKOTE SPRINKLE) 125 MG DR capsule Take 125 mg by mouth 3 times daily Formulation confirmed by pt's pharmacy*       DULoxetine (CYMBALTA) 60 MG capsule Take 60 mg by mouth daily       gabapentin (NEURONTIN) 250 MG/5ML solution Take 50 mg by mouth 4 times daily       gabapentin (NEURONTIN) 250 MG/5ML solution Take 50 mg by mouth every 2 hours as needed for other (anxiety, pain, restlessness) Max 6ml/day*       guaiFENesin-dextromethorphan (ROBITUSSIN DM) 100-10 MG/5ML syrup Take 5 mLs by mouth every 4 hours as needed for cough       mirtazapine (REMERON) 15 MG tablet Take 15 mg by mouth At Bedtime       QUEtiapine (SEROQUEL) 50 MG tablet Take 50 mg by mouth daily       QUEtiapine (SEROQUEL) 50 MG tablet Take 50 mg by mouth every 4 hours as needed (paranoia, agitation) Max 3 prn doses/24hrs.       rivaroxaban ANTICOAGULANT (XARELTO ANTICOAGULANT) 10 MG TABS tablet Take 1 tablet (10 mg) by mouth daily (with dinner) 14 tablet 0     traZODone (DESYREL) 50 MG tablet Take 50 mg by mouth nightly as needed for sleep MR x1 after 30min before 3am for sleeplessness       vitamin D3 (CHOLECALCIFEROL) 50 mcg (2000 units) tablet Take 1 tablet by mouth daily       REVIEW OF SYSTEMS: Limited  "secondary to cognitive impairment but today pt reports 10 point ROS of systems including Constitutional, Eyes, Respiratory, Cardiovascular, Gastroenterology, Genitourinary, Integumentary, Musculoskeletal, Psychiatric were all negative except for pertinent positives noted in my HPI.    Objective: BP (!) 134/90   Pulse 61   Temp 97.8  F (36.6  C)   Resp 16   Ht 1.727 m (5' 8\")   Wt 69 kg (152 lb 1.6 oz)   SpO2 91%   BMI 23.13 kg/m    Limited visit exam done given COVID-19 precautions.   GENERAL APPEARANCE:  Alert, no apparent distress  EYES:  Conjunctiva and lids normal  RESP:  no respiratory distress, no cough  PSYCH:  oriented X 3, insight and judgement impaired, memory impaired , affect abnormal flat    Labs:   none    ASSESSMENT/PLAN:  (U07.1) Infection due to 2019 novel coronavirus  (primary encounter diagnosis)  Comment: Diarrhea is likely still due to COVID. She was negative for cdiff in the hospital and has not been treated with any abx. Her confusion is fairly mild and also probably a result of COVID.  Plan: Imodium prn. Monitor symptoms. Fall precautions.    (F03.91) Dementia with behavioral disturbance, unspecified dementia type (H)  Comment: Chronic, progressive. Currently acutely worse due to illness  Plan: Continue 24 hour care. Monitor for behavioral disturbances.    (I10) Essential hypertension  Comment: Controlled. To avoid risk of hypotension, falls, dizziness and tissue hypoperfusion, recommend  BP goal is < 150/90mmHg.  Plan: Continue current POC with no changes at this time and adjustments as needed.    (I25.10) Coronary artery disease involving native coronary artery of native heart without angina pectoris  Comment: Asymptomatic  Plan: Continue current POC with no changes at this time and adjustments as needed.      Electronically signed by:  PETER Ham [facility]  Phone: [phone]      Video-Visit Details  Type of service:  Video Visit  Video End Time " (time video stopped): 11:01am  Distant Location (provider location):  [facility]

## 2020-10-26 ENCOUNTER — TELEPHONE (OUTPATIENT)
Dept: GERIATRICS | Facility: CLINIC | Age: 85
End: 2020-10-26

## 2020-10-26 NOTE — TELEPHONE ENCOUNTER
[facility] stay 10/9/20 through 10/12/20. Admitted to this facility for rehab, medical management and nursing care. PMH dementia, aortic stenosis s/p AVR, anxiety, HTN, HLD, CAD s/p CABG, colon cancer, renal cell cancer, IBS, and OA. She presented with cough, hypoxia and diarrhea. Tested positive for COVID-19. She did not require supplemental oxygen. She did not receive decadron or remdesivir.       Notified that patient will be discharging back to her [facility] today. Appetite remains reduced. Diarrhea is resolved. No other symptoms.

## 2020-10-27 ENCOUNTER — TELEPHONE (OUTPATIENT)
Dept: GERIATRICS | Facility: CLINIC | Age: 85
End: 2020-10-27

## 2020-10-27 NOTE — TELEPHONE ENCOUNTER
Documentation of Face-to-Face and Certification for Home Health Services     Patient: Mercedes Fraire   YOB: 1932  MR Number: [id number]  Today's Date: 10/27/2020    I certify that patient: Mercedes Fraire is under my care and that I, or a nurse practitioner or physician's assistant working with me, had a face-to-face encounter that meets the physician face-to-face encounter requirements with this patient on: 10/20/2020.    This encounter with the patient was in whole, or in part, for the following medical condition, which is the primary reason for home health care: infection due to covid-19, dementia, HTN, CAD.    I certify that, based on my findings, the following services are medically necessary home health services: Occupational Therapy and Physical Therapy.    My clinical findings support the need for the above services because: Occupational Therapy Services are needed to assess and treat cognitive ability and address ADL safety due to impairment in activity tolerance, insight, and judgement. and Physical Therapy Services are needed to assess and treat the following functional impairments: decondtioning related to covid illness.    Further, I certify that my clinical findings support that this patient is homebound (i.e. absences from home require considerable and taxing effort and are for medical reasons or [facility] services or infrequently or of short duration when for other reasons) because: Requires assistance of another person or specialized equipment to access medical services because patient: Is prone to wander/get lost without assistance...    Based on the above findings. I certify that this patient is confined to the home and needs intermittent skilled nursing care, physical therapy and/or speech therapy.  The patient is under my care, and I have initiated the establishment of the plan of care.  This patient will be followed by a physician who will periodically review the plan of  care.  Physician/Provider to provide follow up care: Que Foote MD    Responsible Medicare certified [facility] Physician: Dr.Dan Jonah MD, signing F2F and only signing for initial order. Please send all follow up questions and concerns or needed follow up signatures to the PCP, who [facility] has on file as:  Que Foote MD.    Date: 10/27/2020

## 2021-01-01 ENCOUNTER — RECORDS - HEALTHEAST (OUTPATIENT)
Dept: ADMINISTRATIVE | Facility: CLINIC | Age: 86
End: 2021-01-01